# Patient Record
Sex: MALE | Race: WHITE | NOT HISPANIC OR LATINO
[De-identification: names, ages, dates, MRNs, and addresses within clinical notes are randomized per-mention and may not be internally consistent; named-entity substitution may affect disease eponyms.]

---

## 2017-01-03 ENCOUNTER — MEDICATION RENEWAL (OUTPATIENT)
Age: 62
End: 2017-01-03

## 2017-01-24 ENCOUNTER — MEDICATION RENEWAL (OUTPATIENT)
Age: 62
End: 2017-01-24

## 2017-06-26 ENCOUNTER — TRANSCRIPTION ENCOUNTER (OUTPATIENT)
Age: 62
End: 2017-06-26

## 2017-08-14 ENCOUNTER — RX RENEWAL (OUTPATIENT)
Age: 62
End: 2017-08-14

## 2017-08-21 ENCOUNTER — RX RENEWAL (OUTPATIENT)
Age: 62
End: 2017-08-21

## 2017-11-07 ENCOUNTER — RX RENEWAL (OUTPATIENT)
Age: 62
End: 2017-11-07

## 2017-12-11 ENCOUNTER — MEDICATION RENEWAL (OUTPATIENT)
Age: 62
End: 2017-12-11

## 2017-12-16 ENCOUNTER — RX RENEWAL (OUTPATIENT)
Age: 62
End: 2017-12-16

## 2017-12-29 ENCOUNTER — CLINICAL ADVICE (OUTPATIENT)
Age: 62
End: 2017-12-29

## 2018-01-24 ENCOUNTER — APPOINTMENT (OUTPATIENT)
Dept: INTERNAL MEDICINE | Facility: CLINIC | Age: 63
End: 2018-01-24
Payer: COMMERCIAL

## 2018-01-24 VITALS
SYSTOLIC BLOOD PRESSURE: 145 MMHG | WEIGHT: 260 LBS | HEIGHT: 71 IN | BODY MASS INDEX: 36.4 KG/M2 | DIASTOLIC BLOOD PRESSURE: 83 MMHG | HEART RATE: 91 BPM

## 2018-01-24 PROCEDURE — 99214 OFFICE O/P EST MOD 30 MIN: CPT

## 2018-02-22 ENCOUNTER — RX RENEWAL (OUTPATIENT)
Age: 63
End: 2018-02-22

## 2018-03-03 ENCOUNTER — RX RENEWAL (OUTPATIENT)
Age: 63
End: 2018-03-03

## 2018-04-05 ENCOUNTER — NON-APPOINTMENT (OUTPATIENT)
Age: 63
End: 2018-04-05

## 2018-04-05 ENCOUNTER — APPOINTMENT (OUTPATIENT)
Dept: INTERNAL MEDICINE | Facility: CLINIC | Age: 63
End: 2018-04-05
Payer: COMMERCIAL

## 2018-04-05 ENCOUNTER — LABORATORY RESULT (OUTPATIENT)
Age: 63
End: 2018-04-05

## 2018-04-05 VITALS — SYSTOLIC BLOOD PRESSURE: 140 MMHG | DIASTOLIC BLOOD PRESSURE: 70 MMHG

## 2018-04-05 DIAGNOSIS — R73.09 OTHER ABNORMAL GLUCOSE: ICD-10-CM

## 2018-04-05 LAB
BILIRUB UR QL STRIP: NORMAL
CLARITY UR: CLEAR
COLLECTION METHOD: NORMAL
GLUCOSE UR-MCNC: NORMAL
HCG UR QL: 0.2 EU/DL
HGB UR QL STRIP.AUTO: NORMAL
KETONES UR-MCNC: NORMAL
LEUKOCYTE ESTERASE UR QL STRIP: NORMAL
NITRITE UR QL STRIP: NORMAL
PH UR STRIP: 5.5
PROT UR STRIP-MCNC: NORMAL
SP GR UR STRIP: 1.02

## 2018-04-05 PROCEDURE — 81003 URINALYSIS AUTO W/O SCOPE: CPT | Mod: QW

## 2018-04-05 PROCEDURE — 99396 PREV VISIT EST AGE 40-64: CPT | Mod: 25

## 2018-04-05 PROCEDURE — G0009: CPT

## 2018-04-05 PROCEDURE — G0444 DEPRESSION SCREEN ANNUAL: CPT | Mod: 59

## 2018-04-05 PROCEDURE — 36415 COLL VENOUS BLD VENIPUNCTURE: CPT

## 2018-04-05 PROCEDURE — G0447 BEHAVIOR COUNSEL OBESITY 15M: CPT

## 2018-04-05 PROCEDURE — 90670 PCV13 VACCINE IM: CPT

## 2018-04-05 PROCEDURE — 93000 ELECTROCARDIOGRAM COMPLETE: CPT

## 2018-04-05 RX ORDER — DOXYCYCLINE HYCLATE 100 MG/1
100 CAPSULE ORAL DAILY
Qty: 10 | Refills: 0 | Status: DISCONTINUED | COMMUNITY
Start: 2017-12-29 | End: 2018-04-05

## 2018-04-08 ENCOUNTER — CLINICAL ADVICE (OUTPATIENT)
Age: 63
End: 2018-04-08

## 2018-04-08 LAB
25(OH)D3 SERPL-MCNC: 28.7 NG/ML
ALBUMIN SERPL ELPH-MCNC: 4.2 G/DL
ALP BLD-CCNC: 123 U/L
ALT SERPL-CCNC: 36 U/L
ANION GAP SERPL CALC-SCNC: 17 MMOL/L
AST SERPL-CCNC: 24 U/L
BASOPHILS # BLD AUTO: 0.03 K/UL
BASOPHILS NFR BLD AUTO: 0.2 %
BILIRUB SERPL-MCNC: 0.4 MG/DL
BUN SERPL-MCNC: 33 MG/DL
CALCIUM SERPL-MCNC: 9.8 MG/DL
CHLORIDE SERPL-SCNC: 99 MMOL/L
CHOLEST SERPL-MCNC: 161 MG/DL
CHOLEST/HDLC SERPL: 3.7 RATIO
CO2 SERPL-SCNC: 24 MMOL/L
CREAT SERPL-MCNC: 1.33 MG/DL
EOSINOPHIL # BLD AUTO: 0.1 K/UL
EOSINOPHIL NFR BLD AUTO: 0.8 %
FERRITIN SERPL-MCNC: 333 NG/ML
GLUCOSE SERPL-MCNC: 115 MG/DL
HBA1C MFR BLD HPLC: 6.2 %
HCT VFR BLD CALC: 43.6 %
HDLC SERPL-MCNC: 43 MG/DL
HGB BLD-MCNC: 14.5 G/DL
IMM GRANULOCYTES NFR BLD AUTO: 0.6 %
LDLC SERPL CALC-MCNC: 85 MG/DL
LYMPHOCYTES # BLD AUTO: 2.52 K/UL
LYMPHOCYTES NFR BLD AUTO: 20.2 %
MAN DIFF?: NORMAL
MCHC RBC-ENTMCNC: 30 PG
MCHC RBC-ENTMCNC: 33.3 GM/DL
MCV RBC AUTO: 90.1 FL
MONOCYTES # BLD AUTO: 0.92 K/UL
MONOCYTES NFR BLD AUTO: 7.4 %
NEUTROPHILS # BLD AUTO: 8.84 K/UL
NEUTROPHILS NFR BLD AUTO: 70.8 %
PLATELET # BLD AUTO: 276 K/UL
POTASSIUM SERPL-SCNC: 4.2 MMOL/L
PROT SERPL-MCNC: 7.3 G/DL
PSA SERPL-MCNC: 1.41 NG/ML
RBC # BLD: 4.84 M/UL
RBC # FLD: 13.4 %
SAVE SPECIMEN: NORMAL
SODIUM SERPL-SCNC: 140 MMOL/L
T3RU NFR SERPL: 1.01 INDEX
T4 SERPL-MCNC: 8.5 UG/DL
TRIGL SERPL-MCNC: 164 MG/DL
TSH SERPL-ACNC: 1.37 UIU/ML
URATE SERPL-MCNC: 9.2 MG/DL
VIT B12 SERPL-MCNC: 540 PG/ML
WBC # FLD AUTO: 12.48 K/UL

## 2018-04-18 ENCOUNTER — MESSAGE (OUTPATIENT)
Age: 63
End: 2018-04-18

## 2018-04-20 ENCOUNTER — RX RENEWAL (OUTPATIENT)
Age: 63
End: 2018-04-20

## 2018-05-22 ENCOUNTER — LABORATORY RESULT (OUTPATIENT)
Age: 63
End: 2018-05-22

## 2018-05-23 ENCOUNTER — APPOINTMENT (OUTPATIENT)
Dept: INTERNAL MEDICINE | Facility: CLINIC | Age: 63
End: 2018-05-23
Payer: COMMERCIAL

## 2018-05-23 PROCEDURE — 36415 COLL VENOUS BLD VENIPUNCTURE: CPT

## 2018-05-24 LAB
25(OH)D3 SERPL-MCNC: 25.6 NG/ML
ALBUMIN SERPL ELPH-MCNC: 4.1 G/DL
ALP BLD-CCNC: 97 U/L
ALT SERPL-CCNC: 24 U/L
ANION GAP SERPL CALC-SCNC: 13 MMOL/L
AST SERPL-CCNC: 23 U/L
BASOPHILS # BLD AUTO: 0.02 K/UL
BASOPHILS NFR BLD AUTO: 0.4 %
BILIRUB SERPL-MCNC: 0.2 MG/DL
BUN SERPL-MCNC: 19 MG/DL
CALCIUM SERPL-MCNC: 9.5 MG/DL
CHLORIDE SERPL-SCNC: 104 MMOL/L
CHOLEST SERPL-MCNC: 156 MG/DL
CHOLEST/HDLC SERPL: 3.9 RATIO
CO2 SERPL-SCNC: 24 MMOL/L
CREAT SERPL-MCNC: 1.29 MG/DL
EOSINOPHIL # BLD AUTO: 0.07 K/UL
EOSINOPHIL NFR BLD AUTO: 1.3 %
HBA1C MFR BLD HPLC: 6.1 %
HCT VFR BLD CALC: 46.9 %
HDLC SERPL-MCNC: 40 MG/DL
HGB BLD-MCNC: 14.3 G/DL
IMM GRANULOCYTES NFR BLD AUTO: 0.6 %
LDLC SERPL CALC-MCNC: 84 MG/DL
LYMPHOCYTES # BLD AUTO: 1.25 K/UL
LYMPHOCYTES NFR BLD AUTO: 23.2 %
MAN DIFF?: NORMAL
MCHC RBC-ENTMCNC: 29 PG
MCHC RBC-ENTMCNC: 30.5 GM/DL
MCV RBC AUTO: 95.1 FL
MONOCYTES # BLD AUTO: 0.37 K/UL
MONOCYTES NFR BLD AUTO: 6.9 %
NEUTROPHILS # BLD AUTO: 3.64 K/UL
NEUTROPHILS NFR BLD AUTO: 67.6 %
PLATELET # BLD AUTO: 193 K/UL
POTASSIUM SERPL-SCNC: 4.3 MMOL/L
PROT SERPL-MCNC: 6.7 G/DL
PSA SERPL-MCNC: 1.09 NG/ML
RBC # BLD: 4.93 M/UL
RBC # FLD: 14.2 %
SAVE SPECIMEN: NORMAL
SODIUM SERPL-SCNC: 141 MMOL/L
T3RU NFR SERPL: 1.04 INDEX
T4 SERPL-MCNC: 7.5 UG/DL
TRIGL SERPL-MCNC: 159 MG/DL
TSH SERPL-ACNC: 1.45 UIU/ML
URATE SERPL-MCNC: 7.9 MG/DL
WBC # FLD AUTO: 5.38 K/UL

## 2018-05-25 LAB — GLUCOSE SERPL-MCNC: 154 MG/DL

## 2018-05-26 ENCOUNTER — RX RENEWAL (OUTPATIENT)
Age: 63
End: 2018-05-26

## 2018-05-29 ENCOUNTER — RX RENEWAL (OUTPATIENT)
Age: 63
End: 2018-05-29

## 2018-06-01 ENCOUNTER — RX RENEWAL (OUTPATIENT)
Age: 63
End: 2018-06-01

## 2018-07-20 ENCOUNTER — RX RENEWAL (OUTPATIENT)
Age: 63
End: 2018-07-20

## 2018-08-02 ENCOUNTER — LABORATORY RESULT (OUTPATIENT)
Age: 63
End: 2018-08-02

## 2018-08-02 ENCOUNTER — RX RENEWAL (OUTPATIENT)
Age: 63
End: 2018-08-02

## 2018-08-02 ENCOUNTER — APPOINTMENT (OUTPATIENT)
Dept: INTERNAL MEDICINE | Facility: CLINIC | Age: 63
End: 2018-08-02
Payer: COMMERCIAL

## 2018-08-02 VITALS — BODY MASS INDEX: 35.49 KG/M2 | WEIGHT: 262 LBS | HEIGHT: 72 IN

## 2018-08-02 VITALS — SYSTOLIC BLOOD PRESSURE: 120 MMHG | DIASTOLIC BLOOD PRESSURE: 70 MMHG

## 2018-08-02 PROCEDURE — 99215 OFFICE O/P EST HI 40 MIN: CPT | Mod: 25

## 2018-08-02 PROCEDURE — 81003 URINALYSIS AUTO W/O SCOPE: CPT | Mod: QW

## 2018-08-02 PROCEDURE — 36415 COLL VENOUS BLD VENIPUNCTURE: CPT

## 2018-08-02 RX ORDER — METOPROLOL SUCCINATE 25 MG/1
25 TABLET, EXTENDED RELEASE ORAL
Qty: 270 | Refills: 0 | Status: DISCONTINUED | COMMUNITY
Start: 2017-09-14 | End: 2018-08-02

## 2018-08-02 RX ORDER — METHYLPREDNISOLONE 4 MG/1
4 TABLET ORAL
Qty: 21 | Refills: 0 | Status: DISCONTINUED | COMMUNITY
Start: 2018-01-08 | End: 2018-08-02

## 2018-08-02 RX ORDER — PREDNISONE 10 MG/1
10 TABLET ORAL
Qty: 15 | Refills: 0 | Status: DISCONTINUED | COMMUNITY
Start: 2018-04-02 | End: 2018-08-02

## 2018-08-02 RX ORDER — PREDNISONE 20 MG/1
20 TABLET ORAL
Qty: 18 | Refills: 0 | Status: DISCONTINUED | COMMUNITY
Start: 2018-04-18 | End: 2018-08-02

## 2018-08-02 RX ORDER — INDOMETHACIN 25 MG/1
25 CAPSULE ORAL
Qty: 10 | Refills: 0 | Status: DISCONTINUED | COMMUNITY
Start: 2018-01-08 | End: 2018-08-02

## 2018-08-02 NOTE — PHYSICAL EXAM
[No Acute Distress] : no acute distress [Well Nourished] : well nourished [No Respiratory Distress] : no respiratory distress  [Clear to Auscultation] : lungs were clear to auscultation bilaterally [Normal Rate] : normal rate  [Regular Rhythm] : with a regular rhythm [Normal S1, S2] : normal S1 and S2 [Soft] : abdomen soft [Non Tender] : non-tender [Non-distended] : non-distended [de-identified] : joint swelling ankles

## 2018-08-02 NOTE — REVIEW OF SYSTEMS
[Fatigue] : fatigue [Abdominal Pain] : abdominal pain [Nausea] : nausea [Joint Pain] : joint pain [Joint Stiffness] : joint stiffness [Joint Swelling] : joint swelling [Negative] : Genitourinary

## 2018-08-02 NOTE — HISTORY OF PRESENT ILLNESS
[de-identified] : This is a 63-year-old gentleman with a history of myocardial infarction, angina, hypertension, gout, hypercholesterolemia who is here because of severe gouty attack of both ankles

## 2018-08-02 NOTE — ASSESSMENT
[FreeTextEntry1] : The patient's physical examination was positive for significant obesity and tenderness and swelling of both ankles. A high refer the patient for a  CT of the abdomen and pelvis .\par I advised him to continue the lower lid is a Medrol pack and colchicine 0.6 mg daily

## 2018-08-03 LAB
25(OH)D3 SERPL-MCNC: 26.6 NG/ML
ALBUMIN SERPL ELPH-MCNC: 4.6 G/DL
ALP BLD-CCNC: 103 U/L
ALT SERPL-CCNC: 22 U/L
ANION GAP SERPL CALC-SCNC: 13 MMOL/L
AST SERPL-CCNC: 21 U/L
BASOPHILS # BLD AUTO: 0.01 K/UL
BASOPHILS NFR BLD AUTO: 0.1 %
BILIRUB SERPL-MCNC: 0.3 MG/DL
BUN SERPL-MCNC: 16 MG/DL
CALCIUM SERPL-MCNC: 9.8 MG/DL
CHLORIDE SERPL-SCNC: 102 MMOL/L
CHOLEST SERPL-MCNC: 174 MG/DL
CHOLEST/HDLC SERPL: 3.5 RATIO
CO2 SERPL-SCNC: 26 MMOL/L
CREAT SERPL-MCNC: 1.19 MG/DL
EOSINOPHIL # BLD AUTO: 0.05 K/UL
EOSINOPHIL NFR BLD AUTO: 0.5 %
GLUCOSE SERPL-MCNC: 130 MG/DL
HBA1C MFR BLD HPLC: 6.2 %
HCT VFR BLD CALC: 47.2 %
HDLC SERPL-MCNC: 50 MG/DL
HGB BLD-MCNC: 14.8 G/DL
IMM GRANULOCYTES NFR BLD AUTO: 0.4 %
LDLC SERPL CALC-MCNC: 103 MG/DL
LYMPHOCYTES # BLD AUTO: 1.32 K/UL
LYMPHOCYTES NFR BLD AUTO: 12.2 %
MAN DIFF?: NORMAL
MCHC RBC-ENTMCNC: 28.7 PG
MCHC RBC-ENTMCNC: 31.4 GM/DL
MCV RBC AUTO: 91.5 FL
MONOCYTES # BLD AUTO: 0.54 K/UL
MONOCYTES NFR BLD AUTO: 5 %
NEUTROPHILS # BLD AUTO: 8.88 K/UL
NEUTROPHILS NFR BLD AUTO: 81.8 %
PLATELET # BLD AUTO: 184 K/UL
POTASSIUM SERPL-SCNC: 4.3 MMOL/L
PROT SERPL-MCNC: 7.2 G/DL
PSA SERPL-MCNC: 1.1 NG/ML
RBC # BLD: 5.16 M/UL
RBC # FLD: 13.6 %
SAVE SPECIMEN: NORMAL
SODIUM SERPL-SCNC: 141 MMOL/L
T3RU NFR SERPL: 1.12 INDEX
T4 SERPL-MCNC: 7.5 UG/DL
TRIGL SERPL-MCNC: 106 MG/DL
TSH SERPL-ACNC: 1.11 UIU/ML
URATE SERPL-MCNC: 4.3 MG/DL
WBC # FLD AUTO: 10.84 K/UL

## 2018-08-19 ENCOUNTER — FORM ENCOUNTER (OUTPATIENT)
Age: 63
End: 2018-08-19

## 2018-08-20 ENCOUNTER — OUTPATIENT (OUTPATIENT)
Dept: OUTPATIENT SERVICES | Facility: HOSPITAL | Age: 63
LOS: 1 days | End: 2018-08-20
Payer: COMMERCIAL

## 2018-08-20 ENCOUNTER — APPOINTMENT (OUTPATIENT)
Dept: CT IMAGING | Facility: CLINIC | Age: 63
End: 2018-08-20

## 2018-08-20 DIAGNOSIS — Z00.8 ENCOUNTER FOR OTHER GENERAL EXAMINATION: ICD-10-CM

## 2018-08-20 PROCEDURE — 74177 CT ABD & PELVIS W/CONTRAST: CPT | Mod: 26

## 2018-08-20 PROCEDURE — 74177 CT ABD & PELVIS W/CONTRAST: CPT

## 2018-08-27 ENCOUNTER — MEDICATION RENEWAL (OUTPATIENT)
Age: 63
End: 2018-08-27

## 2018-08-28 ENCOUNTER — APPOINTMENT (OUTPATIENT)
Dept: UROLOGY | Facility: CLINIC | Age: 63
End: 2018-08-28
Payer: COMMERCIAL

## 2018-08-28 VITALS
DIASTOLIC BLOOD PRESSURE: 78 MMHG | RESPIRATION RATE: 18 BRPM | HEART RATE: 80 BPM | TEMPERATURE: 97.6 F | SYSTOLIC BLOOD PRESSURE: 126 MMHG

## 2018-08-28 PROCEDURE — 99244 OFF/OP CNSLTJ NEW/EST MOD 40: CPT

## 2018-08-30 ENCOUNTER — APPOINTMENT (OUTPATIENT)
Dept: UROLOGY | Facility: CLINIC | Age: 63
End: 2018-08-30

## 2018-09-01 ENCOUNTER — RX RENEWAL (OUTPATIENT)
Age: 63
End: 2018-09-01

## 2018-09-03 LAB
APPEARANCE: CLEAR
BACTERIA UR CULT: NORMAL
BACTERIA: NEGATIVE
BILIRUBIN URINE: NEGATIVE
BLOOD URINE: NEGATIVE
COLOR: YELLOW
CORE LAB FLUID CYTOLOGY: NORMAL
GLUCOSE QUALITATIVE U: NEGATIVE MG/DL
HYALINE CASTS: 2 /LPF
KETONES URINE: NEGATIVE
LEUKOCYTE ESTERASE URINE: NEGATIVE
MICROSCOPIC-UA: NORMAL
NITRITE URINE: NEGATIVE
PH URINE: 6
PROTEIN URINE: NEGATIVE MG/DL
RED BLOOD CELLS URINE: 7 /HPF
SPECIFIC GRAVITY URINE: 1.02
SQUAMOUS EPITHELIAL CELLS: 0 /HPF
UROBILINOGEN URINE: NEGATIVE MG/DL
WHITE BLOOD CELLS URINE: 1 /HPF

## 2018-09-04 ENCOUNTER — FORM ENCOUNTER (OUTPATIENT)
Age: 63
End: 2018-09-04

## 2018-09-05 ENCOUNTER — TRANSCRIPTION ENCOUNTER (OUTPATIENT)
Age: 63
End: 2018-09-05

## 2018-09-05 ENCOUNTER — APPOINTMENT (OUTPATIENT)
Dept: MRI IMAGING | Facility: CLINIC | Age: 63
End: 2018-09-05
Payer: COMMERCIAL

## 2018-09-05 ENCOUNTER — OUTPATIENT (OUTPATIENT)
Dept: OUTPATIENT SERVICES | Facility: HOSPITAL | Age: 63
LOS: 1 days | End: 2018-09-05
Payer: COMMERCIAL

## 2018-09-05 ENCOUNTER — OTHER (OUTPATIENT)
Age: 63
End: 2018-09-05

## 2018-09-05 DIAGNOSIS — N28.89 OTHER SPECIFIED DISORDERS OF KIDNEY AND URETER: ICD-10-CM

## 2018-09-05 PROCEDURE — 74183 MRI ABD W/O CNTR FLWD CNTR: CPT

## 2018-09-05 PROCEDURE — A9585: CPT

## 2018-09-05 PROCEDURE — 74183 MRI ABD W/O CNTR FLWD CNTR: CPT | Mod: 26

## 2018-09-06 ENCOUNTER — APPOINTMENT (OUTPATIENT)
Dept: UROLOGY | Facility: CLINIC | Age: 63
End: 2018-09-06
Payer: COMMERCIAL

## 2018-09-06 DIAGNOSIS — K42.9 UMBILICAL HERNIA W/OUT OBSTRUCTION OR GANGRENE: ICD-10-CM

## 2018-09-06 DIAGNOSIS — N28.89 OTHER SPECIFIED DISORDERS OF KIDNEY AND URETER: ICD-10-CM

## 2018-09-06 PROCEDURE — 99215 OFFICE O/P EST HI 40 MIN: CPT

## 2018-09-07 ENCOUNTER — RX RENEWAL (OUTPATIENT)
Age: 63
End: 2018-09-07

## 2018-09-11 ENCOUNTER — APPOINTMENT (OUTPATIENT)
Dept: UROLOGY | Facility: CLINIC | Age: 63
End: 2018-09-11
Payer: COMMERCIAL

## 2018-09-11 DIAGNOSIS — Z80.9 FAMILY HISTORY OF MALIGNANT NEOPLASM, UNSPECIFIED: ICD-10-CM

## 2018-09-11 DIAGNOSIS — Z80.42 FAMILY HISTORY OF MALIGNANT NEOPLASM OF PROSTATE: ICD-10-CM

## 2018-09-11 PROCEDURE — 99215 OFFICE O/P EST HI 40 MIN: CPT

## 2018-09-12 PROBLEM — Z80.42 FAMILY HISTORY OF MALIGNANT NEOPLASM OF PROSTATE: Status: ACTIVE | Noted: 2018-08-28

## 2018-09-12 PROBLEM — Z80.9 FAMILY HISTORY OF MALIGNANT NEOPLASM: Status: ACTIVE | Noted: 2018-08-28

## 2018-09-12 LAB
APPEARANCE: CLEAR
BACTERIA: NEGATIVE
BILIRUBIN URINE: NEGATIVE
BLOOD URINE: NEGATIVE
COLOR: YELLOW
CORE LAB FLUID CYTOLOGY: NORMAL
GLUCOSE QUALITATIVE U: NEGATIVE MG/DL
KETONES URINE: NEGATIVE
LEUKOCYTE ESTERASE URINE: NEGATIVE
MICROSCOPIC-UA: NORMAL
NITRITE URINE: NEGATIVE
PH URINE: 5.5
PROTEIN URINE: NEGATIVE MG/DL
RED BLOOD CELLS URINE: 2 /HPF
SPECIFIC GRAVITY URINE: 1.02
SQUAMOUS EPITHELIAL CELLS: 0 /HPF
UROBILINOGEN URINE: NEGATIVE MG/DL
WHITE BLOOD CELLS URINE: 0 /HPF

## 2018-09-13 ENCOUNTER — OUTPATIENT (OUTPATIENT)
Dept: OUTPATIENT SERVICES | Facility: HOSPITAL | Age: 63
LOS: 1 days | End: 2018-09-13
Payer: COMMERCIAL

## 2018-09-13 VITALS
DIASTOLIC BLOOD PRESSURE: 80 MMHG | OXYGEN SATURATION: 99 % | HEIGHT: 69.25 IN | SYSTOLIC BLOOD PRESSURE: 130 MMHG | RESPIRATION RATE: 16 BRPM | HEART RATE: 72 BPM | TEMPERATURE: 98 F | WEIGHT: 259.93 LBS

## 2018-09-13 DIAGNOSIS — Z98.890 OTHER SPECIFIED POSTPROCEDURAL STATES: Chronic | ICD-10-CM

## 2018-09-13 DIAGNOSIS — D49.59 NEOPLASM OF UNSPECIFIED BEHAVIOR OF OTHER GENITOURINARY ORGAN: ICD-10-CM

## 2018-09-13 DIAGNOSIS — E66.9 OBESITY, UNSPECIFIED: ICD-10-CM

## 2018-09-13 DIAGNOSIS — I10 ESSENTIAL (PRIMARY) HYPERTENSION: ICD-10-CM

## 2018-09-13 DIAGNOSIS — I25.10 ATHEROSCLEROTIC HEART DISEASE OF NATIVE CORONARY ARTERY WITHOUT ANGINA PECTORIS: ICD-10-CM

## 2018-09-13 DIAGNOSIS — Z98.61 CORONARY ANGIOPLASTY STATUS: Chronic | ICD-10-CM

## 2018-09-13 LAB
APPEARANCE UR: CLEAR — SIGNIFICANT CHANGE UP
BILIRUB UR-MCNC: NEGATIVE — SIGNIFICANT CHANGE UP
BLD GP AB SCN SERPL QL: NEGATIVE — SIGNIFICANT CHANGE UP
BLOOD UR QL VISUAL: NEGATIVE — SIGNIFICANT CHANGE UP
BUN SERPL-MCNC: 24 MG/DL — HIGH (ref 7–23)
CALCIUM SERPL-MCNC: 9.5 MG/DL — SIGNIFICANT CHANGE UP (ref 8.4–10.5)
CHLORIDE SERPL-SCNC: 102 MMOL/L — SIGNIFICANT CHANGE UP (ref 98–107)
CO2 SERPL-SCNC: 24 MMOL/L — SIGNIFICANT CHANGE UP (ref 22–31)
COLOR SPEC: YELLOW — SIGNIFICANT CHANGE UP
CREAT SERPL-MCNC: 1.12 MG/DL — SIGNIFICANT CHANGE UP (ref 0.5–1.3)
GLUCOSE SERPL-MCNC: 110 MG/DL — HIGH (ref 70–99)
GLUCOSE UR-MCNC: NEGATIVE — SIGNIFICANT CHANGE UP
HCT VFR BLD CALC: 45 % — SIGNIFICANT CHANGE UP (ref 39–50)
HGB BLD-MCNC: 15.1 G/DL — SIGNIFICANT CHANGE UP (ref 13–17)
KETONES UR-MCNC: NEGATIVE — SIGNIFICANT CHANGE UP
LEUKOCYTE ESTERASE UR-ACNC: NEGATIVE — SIGNIFICANT CHANGE UP
MCHC RBC-ENTMCNC: 29.5 PG — SIGNIFICANT CHANGE UP (ref 27–34)
MCHC RBC-ENTMCNC: 33.6 % — SIGNIFICANT CHANGE UP (ref 32–36)
MCV RBC AUTO: 87.9 FL — SIGNIFICANT CHANGE UP (ref 80–100)
NITRITE UR-MCNC: NEGATIVE — SIGNIFICANT CHANGE UP
NRBC # FLD: 0 — SIGNIFICANT CHANGE UP
PH UR: 6 — SIGNIFICANT CHANGE UP (ref 5–8)
PLATELET # BLD AUTO: 222 K/UL — SIGNIFICANT CHANGE UP (ref 150–400)
PMV BLD: 9.9 FL — SIGNIFICANT CHANGE UP (ref 7–13)
POTASSIUM SERPL-MCNC: 4.1 MMOL/L — SIGNIFICANT CHANGE UP (ref 3.5–5.3)
POTASSIUM SERPL-SCNC: 4.1 MMOL/L — SIGNIFICANT CHANGE UP (ref 3.5–5.3)
PROT UR-MCNC: 10 — SIGNIFICANT CHANGE UP
RBC # BLD: 5.12 M/UL — SIGNIFICANT CHANGE UP (ref 4.2–5.8)
RBC # FLD: 13.1 % — SIGNIFICANT CHANGE UP (ref 10.3–14.5)
RH IG SCN BLD-IMP: POSITIVE — SIGNIFICANT CHANGE UP
SODIUM SERPL-SCNC: 140 MMOL/L — SIGNIFICANT CHANGE UP (ref 135–145)
SP GR SPEC: 1.02 — SIGNIFICANT CHANGE UP (ref 1–1.04)
UROBILINOGEN FLD QL: NORMAL — SIGNIFICANT CHANGE UP
WBC # BLD: 5.93 K/UL — SIGNIFICANT CHANGE UP (ref 3.8–10.5)
WBC # FLD AUTO: 5.93 K/UL — SIGNIFICANT CHANGE UP (ref 3.8–10.5)

## 2018-09-13 PROCEDURE — 93010 ELECTROCARDIOGRAM REPORT: CPT

## 2018-09-13 NOTE — H&P PST ADULT - HISTORY OF PRESENT ILLNESS
64y/o male presents for preop eval for scheduled right laparoscopic partial nephrectomy on 9/24/2018.  Pt states few months ago experienced abdominal pain, evaluated by pcp.  Ct scan done  and revealed density in right Kidney.  Referred  to urology MRI done.  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

## 2018-09-13 NOTE — H&P PST ADULT - NEGATIVE GENERAL GENITOURINARY SYMPTOMS
no hematuria/no renal colic/no dysuria/no flank pain L/no flank pain R/normal urinary frequency normal urinary frequency/no flank pain R/no dysuria/no hematuria/no renal colic/no flank pain L/right kidney mass - refer to hpi

## 2018-09-13 NOTE — H&P PST ADULT - PMH
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

## 2018-09-13 NOTE — H&P PST ADULT - NEGATIVE CARDIOVASCULAR SYMPTOMS
no chest pain/no dyspnea on exertion/no claudication/no peripheral edema/no orthopnea/no paroxysmal nocturnal dyspnea/no palpitations

## 2018-09-13 NOTE — H&P PST ADULT - PROBLEM SELECTOR PLAN 1
scheduled right laparoscopic partial nephrectomy on 9/24/2018.  preop instructions, gi prophylaxis surgical soap given  pt verbalized understanding   abo on admit

## 2018-09-13 NOTE — H&P PST ADULT - PROBLEM SELECTOR PLAN 2
pt with 1 coronary stent inserted 2009   On plavix & low dose aspirin  Instructed last dose plavix 9/19/18 and continue aspirin, pt to confirm with cardiologist - Dr Betancourt  pending copy of  last cardiology note 7/2018 and Echo & stress test report  medical eval requested by surgeon  pending copy of report

## 2018-09-13 NOTE — H&P PST ADULT - PSH
Arthroplasty, Knee  h/o left  H/O coronary angioplasty  1 stent 2009  History of foot surgery  right

## 2018-09-13 NOTE — H&P PST ADULT - NSANTHOSAYNRD_GEN_A_CORE
No. SHILO screening performed.  STOP BANG Legend: 0-2 = LOW Risk; 3-4 = INTERMEDIATE Risk; 5-8 = HIGH Risk

## 2018-09-13 NOTE — H&P PST ADULT - PROBLEM SELECTOR PROBLEM 4
ANTICOAGULATION FOLLOW-UP CLINIC VISIT    Patient Name:  Shelly Rogers  Date:  10/16/2017  Contact Type:  Face to Face    SUBJECTIVE:     Patient Findings     Positives No Problem Findings           OBJECTIVE    INR Protime   Date Value Ref Range Status   10/16/2017 2.1 (A) 0.86 - 1.14 Final       ASSESSMENT / PLAN  INR assessment THER    Recheck INR In: 4 WEEKS    INR Location Clinic      Anticoagulation Summary as of 10/16/2017     INR goal 2.0-3.0   Today's INR 2.1   Maintenance plan 5 mg (5 mg x 1) on Wed; 2.5 mg (5 mg x 0.5) all other days   Full instructions 5 mg on Wed; 2.5 mg all other days   Weekly total 20 mg   Plan last modified Monet Jaramillo RN (6/19/2017)   Next INR check    Priority INR   Target end date     Indications   Long-term (current) use of anticoagulants [Z79.01] [Z79.01]  Atrial fibrillation (H) [I48.91]         Anticoagulation Episode Summary     INR check location     Preferred lab     Send INR reminders to CS ANTICOAGULATION    Comments       Anticoagulation Care Providers     Provider Role Specialty Phone number    Halley Huff MD [facility] Internal Medicine [phone]            See the Encounter Report to view Anticoagulation Flowsheet and Dosing Calendar (Go to Encounters tab in chart review, and find the Anticoagulation Therapy Visit)    Dosage adjustment made based on physician directed care plan.    Tana Brown RN               
Obesity

## 2018-09-13 NOTE — H&P PST ADULT - FAMILY HISTORY
Father  Still living? No  Family history of heart attack, Age at diagnosis: Age Unknown  Family history of prostate cancer, Age at diagnosis: Age Unknown  Family history of colon cancer, Age at diagnosis: Age Unknown     Sibling  Still living? Yes, Estimated age: Age Unknown  Family history of prostate cancer, Age at diagnosis: Age Unknown     Mother  Still living? No  Family history of colon cancer, Age at diagnosis: Age Unknown

## 2018-09-15 LAB
BACTERIA UR CULT: SIGNIFICANT CHANGE UP
SPECIMEN SOURCE: SIGNIFICANT CHANGE UP

## 2018-09-17 ENCOUNTER — APPOINTMENT (OUTPATIENT)
Dept: INTERNAL MEDICINE | Facility: CLINIC | Age: 63
End: 2018-09-17
Payer: COMMERCIAL

## 2018-09-17 VITALS — SYSTOLIC BLOOD PRESSURE: 120 MMHG | DIASTOLIC BLOOD PRESSURE: 70 MMHG

## 2018-09-17 VITALS — BODY MASS INDEX: 34.67 KG/M2 | HEIGHT: 72 IN | WEIGHT: 256 LBS

## 2018-09-17 DIAGNOSIS — R31.29 OTHER MICROSCOPIC HEMATURIA: ICD-10-CM

## 2018-09-17 PROCEDURE — 99214 OFFICE O/P EST MOD 30 MIN: CPT

## 2018-09-17 NOTE — ASSESSMENT
[Patient Optimized for Surgery] : Patient optimized for surgery [No Further Testing Recommended] : no further testing recommended [Continue anti-platelet treatment as is] : Continue current anti-platelet treatment [As per surgery] : as per surgery [FreeTextEntry4] : The patient's physical examination was normal except for obesity. His EKG and bloods were normal. The patient was advised to hold his Plavix 5 days prior to his surgery but to continue his aspirin. The patient is medically cleared for surgery

## 2018-09-17 NOTE — CONSULT LETTER
[Referral Letter:] : I am referring [unfilled] to you for further evaluation.  My most recent evaluation follows. [Sincerely,] : Sincerely, [FreeTextEntry1] : Right renal alis

## 2018-09-17 NOTE — HISTORY OF PRESENT ILLNESS
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

## 2018-09-23 ENCOUNTER — TRANSCRIPTION ENCOUNTER (OUTPATIENT)
Age: 63
End: 2018-09-23

## 2018-09-24 ENCOUNTER — INPATIENT (INPATIENT)
Facility: HOSPITAL | Age: 63
LOS: 1 days | Discharge: HOME | End: 2018-09-26
Attending: UROLOGY | Admitting: UROLOGY
Payer: COMMERCIAL

## 2018-09-24 ENCOUNTER — RESULT REVIEW (OUTPATIENT)
Age: 63
End: 2018-09-24

## 2018-09-24 ENCOUNTER — APPOINTMENT (OUTPATIENT)
Dept: UROLOGY | Facility: HOSPITAL | Age: 63
End: 2018-09-24

## 2018-09-24 VITALS
OXYGEN SATURATION: 96 % | TEMPERATURE: 99 F | HEART RATE: 81 BPM | SYSTOLIC BLOOD PRESSURE: 130 MMHG | WEIGHT: 259.93 LBS | RESPIRATION RATE: 16 BRPM | HEIGHT: 69.25 IN | DIASTOLIC BLOOD PRESSURE: 74 MMHG

## 2018-09-24 DIAGNOSIS — Z98.890 OTHER SPECIFIED POSTPROCEDURAL STATES: Chronic | ICD-10-CM

## 2018-09-24 DIAGNOSIS — Z98.61 CORONARY ANGIOPLASTY STATUS: Chronic | ICD-10-CM

## 2018-09-24 DIAGNOSIS — D49.59 NEOPLASM OF UNSPECIFIED BEHAVIOR OF OTHER GENITOURINARY ORGAN: ICD-10-CM

## 2018-09-24 PROCEDURE — 76998 US GUIDE INTRAOP: CPT | Mod: 26

## 2018-09-24 PROCEDURE — 50543 LAPARO PARTIAL NEPHRECTOMY: CPT | Mod: RT

## 2018-09-24 PROCEDURE — 88312 SPECIAL STAINS GROUP 1: CPT | Mod: 26

## 2018-09-24 PROCEDURE — 88331 PATH CONSLTJ SURG 1 BLK 1SPC: CPT | Mod: 26

## 2018-09-24 PROCEDURE — 88307 TISSUE EXAM BY PATHOLOGIST: CPT | Mod: 26

## 2018-09-24 RX ORDER — FENTANYL CITRATE 50 UG/ML
25 INJECTION INTRAVENOUS
Qty: 0 | Refills: 0 | Status: DISCONTINUED | OUTPATIENT
Start: 2018-09-24 | End: 2018-09-24

## 2018-09-24 RX ORDER — DOCUSATE SODIUM 100 MG
100 CAPSULE ORAL THREE TIMES A DAY
Qty: 0 | Refills: 0 | Status: DISCONTINUED | OUTPATIENT
Start: 2018-09-24 | End: 2018-09-26

## 2018-09-24 RX ORDER — HYDROMORPHONE HYDROCHLORIDE 2 MG/ML
0.5 INJECTION INTRAMUSCULAR; INTRAVENOUS; SUBCUTANEOUS
Qty: 0 | Refills: 0 | Status: DISCONTINUED | OUTPATIENT
Start: 2018-09-24 | End: 2018-09-24

## 2018-09-24 RX ORDER — ONDANSETRON 8 MG/1
4 TABLET, FILM COATED ORAL EVERY 6 HOURS
Qty: 0 | Refills: 0 | Status: DISCONTINUED | OUTPATIENT
Start: 2018-09-24 | End: 2018-09-26

## 2018-09-24 RX ORDER — SODIUM CHLORIDE 9 MG/ML
1000 INJECTION, SOLUTION INTRAVENOUS
Qty: 0 | Refills: 0 | Status: DISCONTINUED | OUTPATIENT
Start: 2018-09-24 | End: 2018-09-24

## 2018-09-24 RX ORDER — TOBRAMYCIN 0.3 %
1 DROPS OPHTHALMIC (EYE) EVERY 4 HOURS
Qty: 0 | Refills: 0 | Status: COMPLETED | OUTPATIENT
Start: 2018-09-24 | End: 2018-09-25

## 2018-09-24 RX ORDER — METOPROLOL TARTRATE 50 MG
50 TABLET ORAL DAILY
Qty: 0 | Refills: 0 | Status: DISCONTINUED | OUTPATIENT
Start: 2018-09-24 | End: 2018-09-26

## 2018-09-24 RX ORDER — AMLODIPINE BESYLATE 2.5 MG/1
2.5 TABLET ORAL DAILY
Qty: 0 | Refills: 0 | Status: DISCONTINUED | OUTPATIENT
Start: 2018-09-24 | End: 2018-09-26

## 2018-09-24 RX ORDER — PANTOPRAZOLE SODIUM 20 MG/1
40 TABLET, DELAYED RELEASE ORAL
Qty: 0 | Refills: 0 | Status: DISCONTINUED | OUTPATIENT
Start: 2018-09-24 | End: 2018-09-26

## 2018-09-24 RX ORDER — ONDANSETRON 8 MG/1
4 TABLET, FILM COATED ORAL ONCE
Qty: 0 | Refills: 0 | Status: DISCONTINUED | OUTPATIENT
Start: 2018-09-24 | End: 2018-09-24

## 2018-09-24 RX ORDER — OXYCODONE HYDROCHLORIDE 5 MG/1
5 TABLET ORAL EVERY 4 HOURS
Qty: 0 | Refills: 0 | Status: DISCONTINUED | OUTPATIENT
Start: 2018-09-24 | End: 2018-09-26

## 2018-09-24 RX ORDER — ACETAMINOPHEN 500 MG
650 TABLET ORAL EVERY 6 HOURS
Qty: 0 | Refills: 0 | Status: DISCONTINUED | OUTPATIENT
Start: 2018-09-24 | End: 2018-09-26

## 2018-09-24 RX ORDER — HEPARIN SODIUM 5000 [USP'U]/ML
5000 INJECTION INTRAVENOUS; SUBCUTANEOUS EVERY 8 HOURS
Qty: 0 | Refills: 0 | Status: DISCONTINUED | OUTPATIENT
Start: 2018-09-24 | End: 2018-09-26

## 2018-09-24 RX ORDER — SODIUM CHLORIDE 9 MG/ML
1000 INJECTION, SOLUTION INTRAVENOUS
Qty: 0 | Refills: 0 | Status: DISCONTINUED | OUTPATIENT
Start: 2018-09-24 | End: 2018-09-25

## 2018-09-24 RX ORDER — SENNA PLUS 8.6 MG/1
2 TABLET ORAL AT BEDTIME
Qty: 0 | Refills: 0 | Status: DISCONTINUED | OUTPATIENT
Start: 2018-09-24 | End: 2018-09-26

## 2018-09-24 RX ORDER — OXYCODONE HYDROCHLORIDE 5 MG/1
10 TABLET ORAL EVERY 4 HOURS
Qty: 0 | Refills: 0 | Status: DISCONTINUED | OUTPATIENT
Start: 2018-09-24 | End: 2018-09-26

## 2018-09-24 RX ORDER — METOPROLOL TARTRATE 50 MG
25 TABLET ORAL AT BEDTIME
Qty: 0 | Refills: 0 | Status: DISCONTINUED | OUTPATIENT
Start: 2018-09-24 | End: 2018-09-26

## 2018-09-24 RX ORDER — ATORVASTATIN CALCIUM 80 MG/1
20 TABLET, FILM COATED ORAL AT BEDTIME
Qty: 0 | Refills: 0 | Status: DISCONTINUED | OUTPATIENT
Start: 2018-09-24 | End: 2018-09-26

## 2018-09-24 RX ORDER — ASPIRIN/CALCIUM CARB/MAGNESIUM 324 MG
81 TABLET ORAL DAILY
Qty: 0 | Refills: 0 | Status: DISCONTINUED | OUTPATIENT
Start: 2018-09-24 | End: 2018-09-26

## 2018-09-24 RX ADMIN — Medication 25 MILLIGRAM(S): at 21:04

## 2018-09-24 RX ADMIN — SENNA PLUS 2 TABLET(S): 8.6 TABLET ORAL at 21:05

## 2018-09-24 RX ADMIN — HYDROMORPHONE HYDROCHLORIDE 0.5 MILLIGRAM(S): 2 INJECTION INTRAMUSCULAR; INTRAVENOUS; SUBCUTANEOUS at 16:35

## 2018-09-24 RX ADMIN — Medication 100 MILLIGRAM(S): at 21:05

## 2018-09-24 RX ADMIN — Medication 1 DROP(S): at 16:29

## 2018-09-24 RX ADMIN — Medication 81 MILLIGRAM(S): at 21:05

## 2018-09-24 RX ADMIN — ATORVASTATIN CALCIUM 20 MILLIGRAM(S): 80 TABLET, FILM COATED ORAL at 21:05

## 2018-09-24 RX ADMIN — Medication 1 DROP(S): at 16:15

## 2018-09-24 RX ADMIN — OXYCODONE HYDROCHLORIDE 10 MILLIGRAM(S): 5 TABLET ORAL at 19:59

## 2018-09-24 RX ADMIN — Medication 1 DROP(S): at 19:58

## 2018-09-24 RX ADMIN — HEPARIN SODIUM 5000 UNIT(S): 5000 INJECTION INTRAVENOUS; SUBCUTANEOUS at 21:05

## 2018-09-24 RX ADMIN — SODIUM CHLORIDE 125 MILLILITER(S): 9 INJECTION, SOLUTION INTRAVENOUS at 15:22

## 2018-09-24 RX ADMIN — OXYCODONE HYDROCHLORIDE 10 MILLIGRAM(S): 5 TABLET ORAL at 19:05

## 2018-09-24 RX ADMIN — HYDROMORPHONE HYDROCHLORIDE 0.5 MILLIGRAM(S): 2 INJECTION INTRAMUSCULAR; INTRAVENOUS; SUBCUTANEOUS at 16:30

## 2018-09-24 RX ADMIN — HYDROMORPHONE HYDROCHLORIDE 0.5 MILLIGRAM(S): 2 INJECTION INTRAMUSCULAR; INTRAVENOUS; SUBCUTANEOUS at 16:15

## 2018-09-24 NOTE — PROGRESS NOTE ADULT - SUBJECTIVE AND OBJECTIVE BOX
Called to bedside by RN for Pt. with complaint of "something in my eye"  Pt. denies diplopia, blurry vision,  or photophobia.  Eye irrigated with normal saline without alleviation of symptoms.    PE:  OS: PERRL, EOMI, sclera white, conjunctiva pink, no foreign object seen. + corneal abrasion seen at the 9-3 o'clock position across the pupil.  A/P  Corneal abrasion OS  1) tetracaine opht. Solution 0.5% one drop to the affected eye times one  2) Tobramycin opht. solution 0.3% one drop to the affected eye every four hours times three doses  3) F/U with opht. if symptoms persist >24 hours  4) Case D/W Dr. Gupta

## 2018-09-24 NOTE — BRIEF OPERATIVE NOTE - PROCEDURE
<<-----Click on this checkbox to enter Procedure Partial nephrectomy  09/24/2018    Active  [id number]

## 2018-09-24 NOTE — PROGRESS NOTE ADULT - SUBJECTIVE AND OBJECTIVE BOX
Post op check    This 62 yo M is s/p R. lap partial neph  PMH: CAD; stents; gout; HTN; chol  Has L. corneal abrasion; patch on; drops given  Pt is awake and alert  Has no c/o  Afeb 136/66 69 98%RA    Abd- soft; appropriately tender             wounds C&D  Connolly 50 cc /1st hr  KARIN 30

## 2018-09-25 DIAGNOSIS — I10 ESSENTIAL (PRIMARY) HYPERTENSION: ICD-10-CM

## 2018-09-25 DIAGNOSIS — M1A.9XX1 CHRONIC GOUT, UNSPECIFIED, WITH TOPHUS (TOPHI): ICD-10-CM

## 2018-09-25 DIAGNOSIS — I25.10 ATHEROSCLEROTIC HEART DISEASE OF NATIVE CORONARY ARTERY WITHOUT ANGINA PECTORIS: ICD-10-CM

## 2018-09-25 DIAGNOSIS — Z29.9 ENCOUNTER FOR PROPHYLACTIC MEASURES, UNSPECIFIED: ICD-10-CM

## 2018-09-25 DIAGNOSIS — R56.9 UNSPECIFIED CONVULSIONS: ICD-10-CM

## 2018-09-25 LAB
BLD GP AB SCN SERPL QL: NEGATIVE — SIGNIFICANT CHANGE UP
BUN SERPL-MCNC: 22 MG/DL — SIGNIFICANT CHANGE UP (ref 7–23)
CALCIUM SERPL-MCNC: 8.7 MG/DL — SIGNIFICANT CHANGE UP (ref 8.4–10.5)
CHLORIDE SERPL-SCNC: 100 MMOL/L — SIGNIFICANT CHANGE UP (ref 98–107)
CO2 SERPL-SCNC: 22 MMOL/L — SIGNIFICANT CHANGE UP (ref 22–31)
CREAT SERPL-MCNC: 2 MG/DL — HIGH (ref 0.5–1.3)
GLUCOSE BLDC GLUCOMTR-MCNC: 129 MG/DL — HIGH (ref 70–99)
GLUCOSE SERPL-MCNC: 133 MG/DL — HIGH (ref 70–99)
HCT VFR BLD CALC: 41.5 % — SIGNIFICANT CHANGE UP (ref 39–50)
HGB BLD-MCNC: 13.6 G/DL — SIGNIFICANT CHANGE UP (ref 13–17)
MAGNESIUM SERPL-MCNC: 1.8 MG/DL — SIGNIFICANT CHANGE UP (ref 1.6–2.6)
MCHC RBC-ENTMCNC: 28.9 PG — SIGNIFICANT CHANGE UP (ref 27–34)
MCHC RBC-ENTMCNC: 32.8 % — SIGNIFICANT CHANGE UP (ref 32–36)
MCV RBC AUTO: 88.1 FL — SIGNIFICANT CHANGE UP (ref 80–100)
NRBC # FLD: 0 — SIGNIFICANT CHANGE UP
PHOSPHATE SERPL-MCNC: 3 MG/DL — SIGNIFICANT CHANGE UP (ref 2.5–4.5)
PLATELET # BLD AUTO: 197 K/UL — SIGNIFICANT CHANGE UP (ref 150–400)
PMV BLD: 9.7 FL — SIGNIFICANT CHANGE UP (ref 7–13)
POTASSIUM SERPL-MCNC: 4.2 MMOL/L — SIGNIFICANT CHANGE UP (ref 3.5–5.3)
POTASSIUM SERPL-SCNC: 4.2 MMOL/L — SIGNIFICANT CHANGE UP (ref 3.5–5.3)
RBC # BLD: 4.71 M/UL — SIGNIFICANT CHANGE UP (ref 4.2–5.8)
RBC # FLD: 13.3 % — SIGNIFICANT CHANGE UP (ref 10.3–14.5)
RH IG SCN BLD-IMP: POSITIVE — SIGNIFICANT CHANGE UP
SODIUM SERPL-SCNC: 138 MMOL/L — SIGNIFICANT CHANGE UP (ref 135–145)
WBC # BLD: 12.93 K/UL — HIGH (ref 3.8–10.5)
WBC # FLD AUTO: 12.93 K/UL — HIGH (ref 3.8–10.5)

## 2018-09-25 PROCEDURE — 93010 ELECTROCARDIOGRAM REPORT: CPT

## 2018-09-25 PROCEDURE — 99223 1ST HOSP IP/OBS HIGH 75: CPT

## 2018-09-25 RX ORDER — ACETAMINOPHEN 500 MG
1000 TABLET ORAL ONCE
Qty: 0 | Refills: 0 | Status: COMPLETED | OUTPATIENT
Start: 2018-09-25 | End: 2018-09-25

## 2018-09-25 RX ORDER — SODIUM CHLORIDE 9 MG/ML
1000 INJECTION, SOLUTION INTRAVENOUS
Qty: 0 | Refills: 0 | Status: DISCONTINUED | OUTPATIENT
Start: 2018-09-25 | End: 2018-09-26

## 2018-09-25 RX ADMIN — SODIUM CHLORIDE 75 MILLILITER(S): 9 INJECTION, SOLUTION INTRAVENOUS at 21:40

## 2018-09-25 RX ADMIN — AMLODIPINE BESYLATE 2.5 MILLIGRAM(S): 2.5 TABLET ORAL at 05:55

## 2018-09-25 RX ADMIN — Medication 100 MILLIGRAM(S): at 05:18

## 2018-09-25 RX ADMIN — Medication 10 MILLIGRAM(S): at 18:35

## 2018-09-25 RX ADMIN — Medication 650 MILLIGRAM(S): at 07:43

## 2018-09-25 RX ADMIN — SODIUM CHLORIDE 75 MILLILITER(S): 9 INJECTION, SOLUTION INTRAVENOUS at 13:04

## 2018-09-25 RX ADMIN — HEPARIN SODIUM 5000 UNIT(S): 5000 INJECTION INTRAVENOUS; SUBCUTANEOUS at 05:18

## 2018-09-25 RX ADMIN — Medication 400 MILLIGRAM(S): at 18:06

## 2018-09-25 RX ADMIN — Medication 81 MILLIGRAM(S): at 18:35

## 2018-09-25 RX ADMIN — SODIUM CHLORIDE 75 MILLILITER(S): 9 INJECTION, SOLUTION INTRAVENOUS at 18:35

## 2018-09-25 RX ADMIN — PANTOPRAZOLE SODIUM 40 MILLIGRAM(S): 20 TABLET, DELAYED RELEASE ORAL at 05:18

## 2018-09-25 RX ADMIN — HEPARIN SODIUM 5000 UNIT(S): 5000 INJECTION INTRAVENOUS; SUBCUTANEOUS at 21:39

## 2018-09-25 RX ADMIN — Medication 100 MILLIGRAM(S): at 13:03

## 2018-09-25 RX ADMIN — Medication 650 MILLIGRAM(S): at 08:50

## 2018-09-25 RX ADMIN — SODIUM CHLORIDE 125 MILLILITER(S): 9 INJECTION, SOLUTION INTRAVENOUS at 05:19

## 2018-09-25 RX ADMIN — ATORVASTATIN CALCIUM 20 MILLIGRAM(S): 80 TABLET, FILM COATED ORAL at 21:39

## 2018-09-25 RX ADMIN — Medication 50 MILLIGRAM(S): at 05:18

## 2018-09-25 RX ADMIN — Medication 25 MILLIGRAM(S): at 21:39

## 2018-09-25 RX ADMIN — Medication 1 DROP(S): at 00:43

## 2018-09-25 RX ADMIN — HEPARIN SODIUM 5000 UNIT(S): 5000 INJECTION INTRAVENOUS; SUBCUTANEOUS at 13:03

## 2018-09-25 RX ADMIN — Medication 100 MILLIGRAM(S): at 21:39

## 2018-09-25 RX ADMIN — Medication 1000 MILLIGRAM(S): at 18:31

## 2018-09-25 RX ADMIN — Medication 10 MILLIGRAM(S): at 07:35

## 2018-09-25 NOTE — CONSULT NOTE ADULT - SUBJECTIVE AND OBJECTIVE BOX
CC: partial nephrectomy  HPI:  64y/o male presents for preop eval for scheduled right laparoscopic partial nephrectomy on 2018.  Pt states few months ago experienced abdominal pain, evaluated by pcp.  Ct scan done  and revealed density in right Kidney.  Referred  to urology MRI done.  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 Patient s/p partial right nephrectomy yesterday. Seen and evaluated today. Patient stated no flatus or BM yet with abodminal bloating.  Denied chest pain, SOB, n/v/d, leg swelling, fever, chills.    Upon finishing exam, wife started to tell me how patient became unconsious overnight for around 20-30 seconds.  As she was telling me the story, the patient became unconscious in front of me.  In seat, eyes glazed over, breathing rapidly, face flushed, unaware of surroundings with increased tonicity of extremities.  This lasted approxiamtely 20-30 seconds in which a rapid response was called.  This then resolved and patient back to normal state, did not know what occurred, stated felt better than prior. Prior to episode felt nausous.  No visual changes.      upon further history had febrile seizures as a child at 1 years old.  Had 1 similar episode approixmately 8 years ago at home after being sick, no intervention, did not follow up for treatment.        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     Allergies    No Known Allergies    Intolerances        Social History:  Never smoker, occasional ETOH (1 drink per week, last drink was a week ago with a sip of wine).  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     FAMILY HISTORY:  Family history of colon cancer (Father, Mother)  Family history of prostate cancer (Father, Sibling): father   Family history of heart attack (Father)      MEDICATIONS  (STANDING):  amLODIPine   Tablet 2.5 milliGRAM(s) Oral daily  aspirin enteric coated 81 milliGRAM(s) Oral daily  atorvastatin 20 milliGRAM(s) Oral at bedtime  dextrose 5% + sodium chloride 0.45%. 1000 milliLiter(s) (75 mL/Hr) IV Continuous <Continuous>  docusate sodium 100 milliGRAM(s) Oral three times a day  heparin  Injectable 5000 Unit(s) SubCutaneous every 8 hours  metoprolol tartrate 50 milliGRAM(s) Oral daily  metoprolol tartrate 25 milliGRAM(s) Oral at bedtime  pantoprazole    Tablet 40 milliGRAM(s) Oral before breakfast    MEDICATIONS  (PRN):  acetaminophen   Tablet .. 650 milliGRAM(s) Oral every 6 hours PRN Temp greater or equal to 38C (100.4F), Mild Pain (1 - 3)  ondansetron Injectable 4 milliGRAM(s) IV Push every 6 hours PRN Nausea and/or Vomiting  oxyCODONE    IR 5 milliGRAM(s) Oral every 4 hours PRN Moderate Pain (4 - 6)  oxyCODONE    IR 10 milliGRAM(s) Oral every 4 hours PRN Severe Pain (7 - 10)  senna 2 Tablet(s) Oral at bedtime PRN Constipation      HOME MESD: ASA 81/vnflox90/amlodipine 2.5mg/bidfjnm84/lopressor 50mg QAM, 25QPM/ uloric    T(C): 36.3 (18 @ 12:12), Max: 37.3 (18 @ 05:17)  HR: 66 (18 @ 12:12) (65 - 87)  BP: 123/73 (18 @ 12:12) (112/64 - 163/75)  RR: 18 (18 @ 12:12) (12 - 20)  SpO2: 95% (18 @ 12:12) (95% - 99%)  CAPILLARY BLOOD GLUCOSE      POCT Blood Glucose.: 129 mg/dL (25 Sep 2018 11:09)    I&O's Summary    24 Sep 2018 07:  -  25 Sep 2018 07:00  --------------------------------------------------------  IN: 300 mL / OUT: 1112.5 mL / NET: -812.5 mL    25 Sep 2018 07:  -  25 Sep 2018 14:46  --------------------------------------------------------  IN: 0 mL / OUT: 315 mL / NET: -315 mL        PHYSICAL EXAM: (prior to event and after event)  GENERAL: NAD, well-developed  HEAD:  Atraumatic, Normocephalic  EYES: EOMI, PERRLA, conjunctiva and sclera clear  NECK: Supple, No JVD  CHEST/LUNG: Clear to auscultation bilaterally; No wheeze  HEART: Regular rate and rhythm; No murmurs, rubs, or gallops  ABDOMEN: Soft, distended. Tympanic BS and percussion.  TTP at surgical site.   EXTREMITIES:  2+ Peripheral Pulses, No clubbing, cyanosis, or edema  PSYCH: AAOx3  NEUROLOGY: CN II-XII intact. 5/5 UE and LE strenght. Gross sensation intact.   SKIN: No rashes or lesions    LABS:                        13.6   12.93 )-----------( 197      ( 25 Sep 2018 11:19 )             41.5         138  |  100  |  22  ----------------------------<  133<H>  4.2   |  22  |  2.00<H>    Ca    8.7      25 Sep 2018 11:19  Phos  3.0       Mg     1.8                     RADIOLOGY & ADDITIONAL TESTS:    Imaging Personally Reviewed:    Consultant(s) Notes Reviewed:      Care Discussed with Consultants/Other Providers: Urology, Cardiology CC: partial nephrectomy  HPI:  62y/o male presents for preop eval for scheduled right laparoscopic partial nephrectomy on 2018.  Pt states few months ago experienced abdominal pain, evaluated by pcp.  Ct scan done  and revealed density in right Kidney.  Referred  to urology MRI done.  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 Patient s/p partial right nephrectomy yesterday. Seen and evaluated today. Patient stated no flatus or BM yet with abodminal bloating.  Denied chest pain, SOB, n/v/d, leg swelling, fever, chills.    Upon finishing exam, wife started to tell me how patient became unconsious overnight for around 20-30 seconds.  As she was telling me the story, the patient became unconscious in front of me.  In seat, eyes glazed over, breathing rapidly, face flushed, unaware of surroundings with increased tonicity of extremities.  This lasted approxiamtely 20-30 seconds in which a rapid response was called.  This then resolved and patient back to normal state, did not know what occurred, stated felt better than prior. Prior to episode felt nausous.  No visual changes.      upon further history had febrile seizures as a child at 1 years old.  Had 1 similar episode approixmately 8 years ago at home after being sick, no intervention, did not follow up for treatment.        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     Allergies    No Known Allergies    Intolerances        Social History:  Never smoker, occasional ETOH (1 drink per week, last drink was a week ago with a sip of wine).  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     FAMILY HISTORY:  Family history of colon cancer (Father, Mother)  Family history of prostate cancer (Father, Sibling): father   Family history of heart attack (Father)      MEDICATIONS  (STANDING):  amLODIPine   Tablet 2.5 milliGRAM(s) Oral daily  aspirin enteric coated 81 milliGRAM(s) Oral daily  atorvastatin 20 milliGRAM(s) Oral at bedtime  dextrose 5% + sodium chloride 0.45%. 1000 milliLiter(s) (75 mL/Hr) IV Continuous <Continuous>  docusate sodium 100 milliGRAM(s) Oral three times a day  heparin  Injectable 5000 Unit(s) SubCutaneous every 8 hours  metoprolol tartrate 50 milliGRAM(s) Oral daily  metoprolol tartrate 25 milliGRAM(s) Oral at bedtime  pantoprazole    Tablet 40 milliGRAM(s) Oral before breakfast    MEDICATIONS  (PRN):  acetaminophen   Tablet .. 650 milliGRAM(s) Oral every 6 hours PRN Temp greater or equal to 38C (100.4F), Mild Pain (1 - 3)  ondansetron Injectable 4 milliGRAM(s) IV Push every 6 hours PRN Nausea and/or Vomiting  oxyCODONE    IR 5 milliGRAM(s) Oral every 4 hours PRN Moderate Pain (4 - 6)  oxyCODONE    IR 10 milliGRAM(s) Oral every 4 hours PRN Severe Pain (7 - 10)  senna 2 Tablet(s) Oral at bedtime PRN Constipation      HOME MESD: ASA 81/eardeq32/amlodipine 2.5mg/vwjwuyi93/lopressor 50mg QAM, 25QPM/ uloric    T(C): 36.3 (18 @ 12:12), Max: 37.3 (18 @ 05:17)  HR: 66 (18 @ 12:12) (65 - 87)  BP: 123/73 (18 @ 12:12) (112/64 - 163/75)  RR: 18 (18 @ 12:12) (12 - 20)  SpO2: 95% (18 @ 12:12) (95% - 99%)  CAPILLARY BLOOD GLUCOSE      POCT Blood Glucose.: 129 mg/dL (25 Sep 2018 11:09)    I&O's Summary    24 Sep 2018 07:  -  25 Sep 2018 07:00  --------------------------------------------------------  IN: 300 mL / OUT: 1112.5 mL / NET: -812.5 mL    25 Sep 2018 07:  -  25 Sep 2018 14:46  --------------------------------------------------------  IN: 0 mL / OUT: 315 mL / NET: -315 mL        PHYSICAL EXAM: (prior to event and after event)  GENERAL: NAD, well-developed  HEAD:  Atraumatic, Normocephalic  EYES: EOMI, PERRLA, conjunctiva and sclera clear  NECK: Supple, No JVD  CHEST/LUNG: Clear to auscultation bilaterally; No wheeze  HEART: Regular rate and rhythm; No murmurs, rubs, or gallops  ABDOMEN: Soft, distended. Tympanic BS and percussion.  TTP at surgical site.   EXTREMITIES:  2+ Peripheral Pulses, No clubbing, cyanosis, or edema  PSYCH: AAOx3  NEUROLOGY: CN II-XII intact. 5/5 UE and LE strenght. Gross sensation intact.   SKIN: No rashes or lesions    LABS:                        13.6   12.93 )-----------( 197      ( 25 Sep 2018 11:19 )             41.5         138  |  100  |  22  ----------------------------<  133<H>  4.2   |  22  |  2.00<H>    Ca    8.7      25 Sep 2018 11:19  Phos  3.0       Mg     1.8                     RADIOLOGY & ADDITIONAL TESTS:    Imaging Personally Reviewed:  ECG personally reviewed:  NSR with TWI in lead III, aVF, no change from pre-surgical testing ECG in chart.   Consultant(s) Notes Reviewed:      Care Discussed with Consultants/Other Providers: Urology, Cardiology

## 2018-09-25 NOTE — CONSULT NOTE ADULT - ATTENDING COMMENTS
Patient seen and examined with neurology team and above note reviewed and I agree with assessment and plan as outlined. Patient reports that he had febrile seizures as a child but was never on medications for them.   He also reports having syncopal episodes int he past as well but was never fully worked up neurologically. He had another similar event post operatively of blank stare and transient alteration of awareness but no urine incontinence or tongue biting.  He is feeling well now and neurologic exam is normal and no deficits and routine EEG reviewed and was completely normal.  Unclear if episode was vasovagal due to pain, or from certain meds or BPs changes.    Would do MRI brain as outpatient and neurology follow up as outpatient and no further inpatient workup and would not start any anti seizure meds at this time.   All questions answered.

## 2018-09-25 NOTE — CONSULT NOTE ADULT - SUBJECTIVE AND OBJECTIVE BOX
CHIEF COMPLAINT:  syncope psot op day 1 removal partial of kidney  HISTORY OF PRESENT ILLNESS:  HPI:  62y/o male presents for preop eval for scheduled right laparoscopic partial nephrectomy on 2018.  Pt states few months ago experienced abdominal pain, evaluated by pcp.  Ct scan done  and revealed density in right Kidney.  Referred  to urology MRI done.  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 650 milliGRAM(s) Oral every 6 hours PRN  ondansetron Injectable 4 milliGRAM(s) IV Push every 6 hours PRN  oxyCODONE    IR 5 milliGRAM(s) Oral every 4 hours PRN  oxyCODONE    IR 10 milliGRAM(s) Oral every 4 hours PRN    docusate sodium 100 milliGRAM(s) Oral three times a day  pantoprazole    Tablet 40 milliGRAM(s) Oral before breakfast  senna 2 Tablet(s) Oral at bedtime PRN    atorvastatin 20 milliGRAM(s) Oral at bedtime    dextrose 5% + sodium chloride 0.45%. 1000 milliLiter(s) IV Continuous <Continuous>      FAMILY HISTORY:  Family history of colon cancer (Father, Mother)  Family history of prostate cancer (Father, Sibling): father   Family history of heart attack (Father)      Allergies    No Known Allergies    Intolerances    	      PHYSICAL EXAM:  T(C): 37.2 (18 @ 16:32), Max: 37.3 (18 @ 05:17)  HR: 90 (18 @ 16:32) (66 - 90)  BP: 153/76 (18 @ 16:32) (112/64 - 163/75)  RR: 18 (18 @ 16:32) (12 - 18)  SpO2: 97% (18 @ 16:32) (95% - 99%)  Wt(kg): --  I&O's Summary    24 Sep 2018 07:  -  25 Sep 2018 07:00  --------------------------------------------------------  IN: 300 mL / OUT: 1112.5 mL / NET: -812.5 mL    25 Sep 2018 07:  -  25 Sep 2018 16:44  --------------------------------------------------------  IN: 0 mL / OUT: 315 mL / NET: -315 mL        Appearance: Normal	  HEENT:   Normal oral mucosa, PERRL, EOMI	  Cardiovascular: Normal S1 S2, No JVD, No murmurs  Respiratory: Lungs clear to auscultation	  Psychiatry: A & O x 3, Mood & affect appropriate  Gastrointestinal:  Soft, Non-tender, + BS	  Skin: No rashes, No ecchymoses, No cyanosis	  Neurologic: Non-focal  Extremities: No clubbing, cyanosis or edema  Vascular: Peripheral pulses palpable 2+ bilaterally    TELEMETRY: 	    ECG:  NSR inferior wall Mi age indeterminate no change	  RADIOLOGY:  < from: CT Abdomen and Pelvis w/ Oral Cont and w/ IV Cont (18 @ 10:10) >  MPRESSION: A 2.5 cm right lower pole renal lesion worrisome for   neoplasm; MRI recommended for further evalu  	  	  LABS:	 	    CARDIAC MARKERS:                                  13.6   12.93 )-----------( 197      ( 25 Sep 2018 11:19 )             41.5         138  |  100  |  22  ----------------------------<  133<H>  4.2   |  22  |  2.00<H>    Ca    8.7      25 Sep 2018 11:19  Phos  3.0       Mg     1.8           proBNP:   Lipid Profile:   HgA1c:   TSH:

## 2018-09-25 NOTE — PROGRESS NOTE ADULT - SUBJECTIVE AND OBJECTIVE BOX
s/p Right lap partial nx.  POD 1  One episode of emesis yesterday.  Feels better now.  Pain controlled.    Vital Signs Last 24 Hrs  T(C): 37.3 (25 Sep 2018 05:17), Max: 37.3 (25 Sep 2018 05:17)  T(F): 99.2 (25 Sep 2018 05:17), Max: 99.2 (25 Sep 2018 05:17)  HR: 83 (25 Sep 2018 05:17) (65 - 87)  BP: 143/72 (25 Sep 2018 05:17) (129/75 - 163/75)  BP(mean): 87 (24 Sep 2018 15:05) (86 - 87)  RR: 17 (25 Sep 2018 05:17) (12 - 20)  SpO2: 98% (25 Sep 2018 05:17) (96% - 99%)    I&O's Detail    24 Sep 2018 07:01  -  25 Sep 2018 06:53  --------------------------------------------------------  IN:    lactated ringers.: 250 mL    Oral Fluid: 50 mL  Total IN: 300 mL    OUT:    Bulb: 272.5 mL    Indwelling Catheter - Urethral: 840 mL  Total OUT: 1112.5 mL    Total NET: -812.5 mL    Abdomen soft, mild distended, minimal tender  Connolly: urine clear, removed this am.

## 2018-09-25 NOTE — CONSULT NOTE ADULT - ASSESSMENT
62 yo male PMHD CAD s/p PCI 2009, febrile infantile seizures, HTN admitted for partial nephrectomy POD#1 c/b seizure vs. convulsive syncope

## 2018-09-25 NOTE — CONSULT NOTE ADULT - PROBLEM SELECTOR RECOMMENDATION 3
POD#1 of partial nephrectomy  -no faltus yet.  -post-op care as per urology with pain and bowel regimen.

## 2018-09-25 NOTE — PROGRESS NOTE ADULT - SUBJECTIVE AND OBJECTIVE BOX
POD #1    Afeb 144/65 84 98%RA  L. eye abrasion improved  Pt has no c/o  Abd- soft NT ND; no flatus    wounds C&D    Mohsen 375  KARIN 117

## 2018-09-25 NOTE — CONSULT NOTE ADULT - PROBLEM SELECTOR RECOMMENDATION 9
patient with witnessed seizure vs. convulsive syncope  -more likely seizure based on presentation, but cannot r/o convulsive syncope either in setting of vasovagal vs. arrhythmogenic.  -no post-ictal state, but had short prodrome, hypertonicity.  -neuro and cards consulted  -moved patient to telemetry to monitor  -for now even though posisble second episode, will hold off on AED until neuro sees patient. If another event, would start keppra load.  -check labs for electrolyte abnormalities  -EEG ordered.

## 2018-09-25 NOTE — CONSULT NOTE ADULT - ASSESSMENT
1. recurrent episoded post op day 1 nausea diaphoresis and syncope probably vasovagal episodes  2. no  evidence of arrythmia  3. doubt seizure activity  4. chronic ischemic heart disease stable  5. sue -?dehydration (creatinine 2)  Recommend  monitor  heart rate  orthostatic BP  keep hydrated  will follow  Micheal Betancourt MD

## 2018-09-25 NOTE — CONSULT NOTE ADULT - SUBJECTIVE AND OBJECTIVE BOX
JOSE VIRKPatient is a 63y old  Male who presents with a chief complaint of Right kidney mass (25 Sep 2018 06:52)      HPI:  64y/o male presents for preop eval for scheduled right laparoscopic partial nephrectomy on 2018.  Pt states few months ago experienced abdominal pain, evaluated by pcp.  Ct scan done  and revealed density in right Kidney.  Referred  to urology MRI done.  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 (13 Sep 2018 12:36)    Neurology consulted for multiple episodes of unresponsiveness and staring. Per wife at beside first episode occurred two nights ago while she was speaking to him. He suddenly stopped, his eyes rolled back and he didn't respond to her for about 20-30 seconds. The second pt remembers that he was feeling very nauseous and gaseous with abdominal pain and the next thing he remembers he woke and he had vomited on himself and he had to call the nurse in. The last episode was seen by the hospitalist who while talking to him saw that he started to stare off and became unresponsive even with vigorous stimulation, again lasting about 30 seconds. The next thing the pt remembers is multiple voices around him as they had called an RRT. He does not remembers the episodes specifically. There is no post event confusion, no tongue biting, no incontinence. Per wife pt had similar events 10 yrs ago that when he also hadvomiting and was febrile. At that time had about 2 events that was witnessed by EMS. He was monitored inpatient without any further events. No other workup was done at that time. He has history of febrile seizures as a toddler. Son also has hx of febrile seizures.      MEDICATIONS  (STANDING):  amLODIPine   Tablet 2.5 milliGRAM(s) Oral daily  aspirin enteric coated 81 milliGRAM(s) Oral daily  atorvastatin 20 milliGRAM(s) Oral at bedtime  dextrose 5% + sodium chloride 0.45%. 1000 milliLiter(s) (75 mL/Hr) IV Continuous <Continuous>  docusate sodium 100 milliGRAM(s) Oral three times a day  heparin  Injectable 5000 Unit(s) SubCutaneous every 8 hours  metoprolol tartrate 50 milliGRAM(s) Oral daily  metoprolol tartrate 25 milliGRAM(s) Oral at bedtime  pantoprazole    Tablet 40 milliGRAM(s) Oral before breakfast    MEDICATIONS  (PRN):  acetaminophen   Tablet .. 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  Osteoarthritis  CAD (coronary artery disease)  Gout  Obesity  Hypertension  Hypercholesterolemia  History of foot surgery: right  H/O coronary angioplasty: 1 stent 2009  Arthroplasty, Knee: h/o left    FAMILY HISTORY:  Family history of colon cancer (Father, Mother)  Family history of prostate cancer (Father, Sibling): father   Family history of heart attack (Father)  febrile seizures as child   febrile seizures in son    Allergies: No Known Allergies    Intolerances: none    SHx - No smoking, No ETOH, No drug abuse      REVIEW OF SYSTEMS:    Constitutional: No fever, weight loss, or fatigue  Eyes: No eye pain, visual disturbances, or discharge  ENMT:  No difficulty hearing, tinnitus, vertigo; No sinus or throat pain  Neck: No pain or stiffness  Respiratory: No cough, wheezing, or shortness of breath  Cardiovascular: No chest pain, palpitations, or leg swelling  Gastrointestinal: No abdominal pain, nausea, vomiting, diarrhea or constipation.   Genitourinary: No dysuria, frequency, hematuria, or incontinence  Neurological: As per HPI  Skin: No itching, burning, rashes, or lesions   Endocrine: No heat or cold intolerance; No hair loss  Musculoskeletal: No joint pain or swelling; No muscle, back, or extremity pain  Psychiatric: No depression, anxiety, mood swings, or difficulty sleeping  Heme/Lymph: No easy bruising or bleeding; No enlarged glands      Vital Signs Last 24 Hrs  T(C): 36.3 (25 Sep 2018 12:12), Max: 37.3 (25 Sep 2018 05:17)  T(F): 97.4 (25 Sep 2018 12:12), Max: 99.2 (25 Sep 2018 05:17)  HR: 66 (25 Sep 2018 12:12) (65 - 87)  BP: 123/73 (25 Sep 2018 12:12) (112/64 - 163/75)  BP(mean): 87 (24 Sep 2018 15:05) (86 - 87)  RR: 18 (25 Sep 2018 12:12) (12 - 20)  SpO2: 95% (25 Sep 2018 12:12) (95% - 99%)    General Exam:   General appearance: No acute distress    Cardiac: RRR  Pulm:  breathing comfortably               Neurological Exam:  Mental Status: Orientated to self, date and place.  Attention intact.  No dysarthria. Speech fluent.  Cranial Nerves:   PERRL, EOMI, VFF, no nystagmus.    CN V1-3 intact to light touch .  No facial asymmetry.  Hearing intact bilaterally.  Tongue  midline.  Sternocleidomastoid and Trapezius intact bilaterally.    Motor:   Tone: normal.                  Strength:     [] Upper extremity                      Delt       Bicep    Tricep                                                  R         5/5        5/5        5/5       5/5                                               L          5/5        5/5        5/5       5/5  [] Lower extremity                       HF          KE          KF        DF         PF                                               R        5/        5/5        5/5       5/5       5/5                                               L         5/5        5/5       5/5       5/5        5/5             Dysmetria: None to finger-nose-finger or heel-shin-heel  No truncal ataxia.    Tremor: No resting, postural or action tremor.  No myoclonus.    Sensation: intact to light touch, pinprick, vibration and proprioception    Deep Tendon Reflexes:     Biceps          Triceps      BR        Patellar        Ankle         Babinski                                  R       2+                   2+           2+            2+               2+           downgoing                                  L        2+                  2+           2+            2+               2+           downgoing    Gait: deferred.      Other:        138  |  100  |  22  ----------------------------<  133<H>  4.2   |  22  |  2.00<H>    Ca    8.7      25 Sep 2018 11:19  Phos  3.0       Mg     1.8                                   13.6   12.93 )-----------( 197      ( 25 Sep 2018 11:19 )             41.5 JOSE VIRKPatient is a 63y old  Male who presents with a chief complaint of Right kidney mass (25 Sep 2018 06:52)      HPI:  62y/o male presents for preop eval for scheduled right laparoscopic partial nephrectomy on 2018.  Pt states few months ago experienced abdominal pain, evaluated by pcp.  Ct scan done  and revealed density in right Kidney.  Referred  to urology MRI done.  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 (13 Sep 2018 12:36)    Neurology consulted for multiple episodes of unresponsiveness and staring. Per wife at beside first episode occurred two nights ago while she was speaking to him. He suddenly stopped, his eyes rolled back and he didn't respond to her for about 20-30 seconds. The second pt remembers that he was feeling very nauseous and gaseous with abdominal pain and the next thing he remembers he woke and he had vomited on himself and he had to call the nurse in. The last episode was seen by the hospitalist who while talking to him saw that he started to stare off and became unresponsive even with vigorous stimulation, again lasting about 30 seconds. The next thing the pt remembers is multiple voices around him as they had called an RRT. He does not remembers the episodes specifically. There is no post event confusion, no tongue biting, no incontinence. Per wife pt had similar events 10 yrs ago that when he also hadvomiting and was febrile. At that time had about 2 events that was witnessed by EMS. He was monitored inpatient without any further events. No other workup was done at that time. He has history of febrile seizures as a toddler. Son also has hx of febrile seizures. No new medications. Pt had some pain medications 2 nights ago but none since other than tylenol.       MEDICATIONS  (STANDING):  amLODIPine   Tablet 2.5 milliGRAM(s) Oral daily  aspirin enteric coated 81 milliGRAM(s) Oral daily  atorvastatin 20 milliGRAM(s) Oral at bedtime  dextrose 5% + sodium chloride 0.45%. 1000 milliLiter(s) (75 mL/Hr) IV Continuous <Continuous>  docusate sodium 100 milliGRAM(s) Oral three times a day  heparin  Injectable 5000 Unit(s) SubCutaneous every 8 hours  metoprolol tartrate 50 milliGRAM(s) Oral daily  metoprolol tartrate 25 milliGRAM(s) Oral at bedtime  pantoprazole    Tablet 40 milliGRAM(s) Oral before breakfast    MEDICATIONS  (PRN):  acetaminophen   Tablet .. 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  Osteoarthritis  CAD (coronary artery disease)  Gout  Obesity  Hypertension  Hypercholesterolemia  History of foot surgery: right  H/O coronary angioplasty: 1 stent   Arthroplasty, Knee: h/o left    FAMILY HISTORY:  Family history of colon cancer (Father, Mother)  Family history of prostate cancer (Father, Sibling): father   Family history of heart attack (Father)  febrile seizures as child   febrile seizures in son    Allergies: No Known Allergies    Intolerances: none    SHx - No smoking, No ETOH, No drug abuse      REVIEW OF SYSTEMS:    Constitutional: No fever, weight loss, or fatigue  Eyes: No eye pain, visual disturbances, or discharge  ENMT:  No difficulty hearing, tinnitus, vertigo; No sinus or throat pain  Neck: No pain or stiffness  Respiratory: No cough, wheezing, or shortness of breath  Cardiovascular: No chest pain, palpitations, or leg swelling  Gastrointestinal: No abdominal pain, nausea, vomiting, diarrhea or constipation.   Genitourinary: No dysuria, frequency, hematuria, or incontinence  Neurological: As per HPI  Skin: No itching, burning, rashes, or lesions   Endocrine: No heat or cold intolerance; No hair loss  Musculoskeletal: No joint pain or swelling; No muscle, back, or extremity pain  Psychiatric: No depression, anxiety, mood swings, or difficulty sleeping  Heme/Lymph: No easy bruising or bleeding; No enlarged glands      Vital Signs Last 24 Hrs  T(C): 36.3 (25 Sep 2018 12:12), Max: 37.3 (25 Sep 2018 05:17)  T(F): 97.4 (25 Sep 2018 12:12), Max: 99.2 (25 Sep 2018 05:17)  HR: 66 (25 Sep 2018 12:12) (65 - 87)  BP: 123/73 (25 Sep 2018 12:12) (112/64 - 163/75)  BP(mean): 87 (24 Sep 2018 15:05) (86 - 87)  RR: 18 (25 Sep 2018 12:12) (12 - 20)  SpO2: 95% (25 Sep 2018 12:12) (95% - 99%)    General Exam:   General appearance: No acute distress    Cardiac: RRR  Pulm:  breathing comfortably               Neurological Exam:  Mental Status: Orientated to self, date and place.  Attention intact.  No dysarthria. Speech fluent.  Cranial Nerves:   PERRL, EOMI, VFF, no nystagmus.    CN V1-3 intact to light touch .  No facial asymmetry.  Hearing intact bilaterally.  Tongue  midline.  Sternocleidomastoid and Trapezius intact bilaterally.    Motor:   Tone: normal.                  Strength:     [] Upper extremity                      Delt       Bicep    Tricep                                                  R         5/5        5/5        5/5       5/5                                               L          5/5        5/5        5/5       5/5  [] Lower extremity                       HF          KE          KF        DF         PF                                               R        5/        5/5        5/5       5/5       5/5                                               L         5/5        5/5       5/5       5/5        5/5             Dysmetria: None to finger-nose-finger or heel-shin-heel  No truncal ataxia.    Tremor: No resting, postural or action tremor.  No myoclonus.    Sensation: intact to light touch, pinprick, vibration and proprioception    Deep Tendon Reflexes:     Biceps          Triceps      BR        Patellar        Ankle         Babinski                                  R       2+                   2+           2+            2+               2+           downgoing                                  L        2+                  2+           2+            2+               2+           downgoing    Gait: deferred.      Other:        138  |  100  |  22  ----------------------------<  133<H>  4.2   |  22  |  2.00<H>    Ca    8.7      25 Sep 2018 11:19  Phos  3.0       Mg     1.8                                   13.6   12.93 )-----------( 197      ( 25 Sep 2018 11:19 )             41.5

## 2018-09-25 NOTE — CONSULT NOTE ADULT - ASSESSMENT
64 y/o man with hx of 62 y/o man with hx of CAD s/p stents, HTN, HLD ,  POD 1 from Right partial nephrectomy for kidney mass p/w episodes of staring and unresponsiveness unaccompanied by shaking, post-ictal confusion, or incontinence with a hx of similar episodes in the past. Concern for possible focal seizures with loss of awareness although low likelihood.     Episodes usually take place in the setting of fever, abdominal discomfort or vomiting.    Would obtain routine EEG for now. Will consider longer study if pt has further episodes or routine is abnormal.   No indication for AED's at this time.   neuro checks

## 2018-09-26 ENCOUNTER — TRANSCRIPTION ENCOUNTER (OUTPATIENT)
Age: 63
End: 2018-09-26

## 2018-09-26 VITALS
TEMPERATURE: 98 F | RESPIRATION RATE: 18 BRPM | HEART RATE: 77 BPM | DIASTOLIC BLOOD PRESSURE: 69 MMHG | SYSTOLIC BLOOD PRESSURE: 124 MMHG | OXYGEN SATURATION: 97 %

## 2018-09-26 DIAGNOSIS — R40.20 UNSPECIFIED COMA: ICD-10-CM

## 2018-09-26 LAB
BUN SERPL-MCNC: 22 MG/DL — SIGNIFICANT CHANGE UP (ref 7–23)
CALCIUM SERPL-MCNC: 8.6 MG/DL — SIGNIFICANT CHANGE UP (ref 8.4–10.5)
CHLORIDE SERPL-SCNC: 100 MMOL/L — SIGNIFICANT CHANGE UP (ref 98–107)
CO2 SERPL-SCNC: 26 MMOL/L — SIGNIFICANT CHANGE UP (ref 22–31)
CREAT SERPL-MCNC: 2.18 MG/DL — HIGH (ref 0.5–1.3)
GLUCOSE SERPL-MCNC: 127 MG/DL — HIGH (ref 70–99)
HCT VFR BLD CALC: 41.6 % — SIGNIFICANT CHANGE UP (ref 39–50)
HGB BLD-MCNC: 13.5 G/DL — SIGNIFICANT CHANGE UP (ref 13–17)
MCHC RBC-ENTMCNC: 29 PG — SIGNIFICANT CHANGE UP (ref 27–34)
MCHC RBC-ENTMCNC: 32.5 % — SIGNIFICANT CHANGE UP (ref 32–36)
MCV RBC AUTO: 89.3 FL — SIGNIFICANT CHANGE UP (ref 80–100)
NRBC # FLD: 0 — SIGNIFICANT CHANGE UP
PLATELET # BLD AUTO: 163 K/UL — SIGNIFICANT CHANGE UP (ref 150–400)
PMV BLD: 10.2 FL — SIGNIFICANT CHANGE UP (ref 7–13)
POTASSIUM SERPL-MCNC: 4 MMOL/L — SIGNIFICANT CHANGE UP (ref 3.5–5.3)
POTASSIUM SERPL-SCNC: 4 MMOL/L — SIGNIFICANT CHANGE UP (ref 3.5–5.3)
RBC # BLD: 4.66 M/UL — SIGNIFICANT CHANGE UP (ref 4.2–5.8)
RBC # FLD: 13.4 % — SIGNIFICANT CHANGE UP (ref 10.3–14.5)
SODIUM SERPL-SCNC: 137 MMOL/L — SIGNIFICANT CHANGE UP (ref 135–145)
WBC # BLD: 8.98 K/UL — SIGNIFICANT CHANGE UP (ref 3.8–10.5)
WBC # FLD AUTO: 8.98 K/UL — SIGNIFICANT CHANGE UP (ref 3.8–10.5)

## 2018-09-26 PROCEDURE — 99222 1ST HOSP IP/OBS MODERATE 55: CPT | Mod: GC

## 2018-09-26 PROCEDURE — 95819 EEG AWAKE AND ASLEEP: CPT | Mod: 26

## 2018-09-26 PROCEDURE — 99233 SBSQ HOSP IP/OBS HIGH 50: CPT

## 2018-09-26 RX ORDER — ACETAMINOPHEN 500 MG
1000 TABLET ORAL ONCE
Qty: 0 | Refills: 0 | Status: COMPLETED | OUTPATIENT
Start: 2018-09-26 | End: 2018-09-26

## 2018-09-26 RX ORDER — OXYCODONE HYDROCHLORIDE 5 MG/1
2 TABLET ORAL
Qty: 40 | Refills: 0
Start: 2018-09-26 | End: 2018-09-30

## 2018-09-26 RX ORDER — CLOPIDOGREL BISULFATE 75 MG/1
1 TABLET, FILM COATED ORAL
Qty: 0 | Refills: 0 | COMMUNITY

## 2018-09-26 RX ADMIN — Medication 50 MILLIGRAM(S): at 05:41

## 2018-09-26 RX ADMIN — HEPARIN SODIUM 5000 UNIT(S): 5000 INJECTION INTRAVENOUS; SUBCUTANEOUS at 05:38

## 2018-09-26 RX ADMIN — Medication 100 MILLIGRAM(S): at 05:38

## 2018-09-26 RX ADMIN — PANTOPRAZOLE SODIUM 40 MILLIGRAM(S): 20 TABLET, DELAYED RELEASE ORAL at 05:38

## 2018-09-26 RX ADMIN — Medication 400 MILLIGRAM(S): at 00:50

## 2018-09-26 RX ADMIN — AMLODIPINE BESYLATE 2.5 MILLIGRAM(S): 2.5 TABLET ORAL at 05:38

## 2018-09-26 RX ADMIN — Medication 1000 MILLIGRAM(S): at 01:20

## 2018-09-26 NOTE — DISCHARGE NOTE ADULT - PATIENT PORTAL LINK FT
You can access the [facility], offered by [facility], by registering with the following website: [email or web address]

## 2018-09-26 NOTE — PROGRESS NOTE ADULT - ASSESSMENT
64 yo male PMHD CAD s/p PCI 2009, febrile infantile seizures, HTN admitted for partial nephrectomy POD#1 c/b seizure vs. convulsive syncope

## 2018-09-26 NOTE — DISCHARGE NOTE ADULT - MEDICATION SUMMARY - MEDICATIONS TO TAKE
I will START or STAY ON the medications listed below when I get home from the hospital:    oxyCODONE 5 mg oral tablet  -- 1-2 tab(s) by mouth every 6 hours, As Needed -Moderate Pain (4 - 6) MDD:8  -- Indication: For pain as needed    Aspir 81 oral delayed release tablet  -- 1 tab(s) by mouth once a day at night  -- Indication: For Home med    Zetia 10 mg oral tablet  -- 1 tab(s) by mouth once a day at night  -- Indication: For Home med    fluvastatin 80 mg oral tablet, extended release  -- 1 tab(s) by mouth once a day at night  -- Indication: For Home med    Uloric 40 mg oral tablet  -- 1 tab(s) by mouth once a day AM  -- Indication: For Home med    metoprolol tartrate 25 mg oral tablet  -- orally 2 tabs in Am 1 tab at night    -- Indication: For Home med    amLODIPine 2.5 mg oral tablet  -- 1 tab(s) by mouth once a day AM  -- Indication: For Home med    pantoprazole 40 mg oral delayed release tablet  -- 1 tab(s) by mouth once a day AM  -- Indication: For Home med    Vitamin D3 1000 intl units oral capsule  -- 1 cap(s) by mouth once a day AM  -- Indication: For Home med

## 2018-09-26 NOTE — DISCHARGE NOTE ADULT - CARE PLAN
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

## 2018-09-26 NOTE — DISCHARGE NOTE ADULT - PLAN OF CARE
partial neph as above; drink plenty of fluids; change dressing at drain site daily or as needed until dry; call office for appt; call for fever over 101/nausea/vomiting; avoid constipation while taking pain meds with fiber or stool softener

## 2018-09-26 NOTE — PROGRESS NOTE ADULT - PROBLEM SELECTOR PLAN 1
partial seizure vs. vasovagal syncope.  -given abdominal bloating and pain with nausea, more liekyl vasovagla, however had febriles seizures, an episode a few years ago not worked up.  -appreciate neuro recs, EEG performed no epileptiform waves.  -if neuro clears, ok for D/C, will defer to neuro re: ability to drive.

## 2018-09-26 NOTE — DISCHARGE NOTE ADULT - HOSPITAL COURSE
Pt had lap partial neph; in PACU wife noticed his eyes roll back and unresponsive a few seconds; then POD#1 hospitalist witnessed pt just staring, not responding, stiffening of arms and legs; rapid response was called; pt was fully awake by then, not post-ictal; claims it happened as a child with a fever, and a long time ago with nausea; not worked up;  pt sent to telemetry; no events; seen by his cardiologist, no cardiac w/u necessary; he stopped his plavix permanently;  seen by neuro; had EEG; normal;   KARIN removed today and home; voided well, passed gas and carolyn reg diet.

## 2018-09-26 NOTE — DISCHARGE NOTE ADULT - CONDITIONS AT DISCHARGE
Patient is AXOX4. Denied chest pain and shortness of breath. Vital signs remained stable. Pain was assessed and remained at an acceptable level with interventions. Incentive spirometer encouraged. Patient ambulated in hallway. Patient safety maintained through out shift. Surgical incision is d/c/i. KARIN drain was removed. IV's are being removed and patient is being discharged.

## 2018-09-26 NOTE — PROGRESS NOTE ADULT - SUBJECTIVE AND OBJECTIVE BOX
Subjective: Patient seen and examined. No new events except as noted.   patient feels well and has had no recurrence of sycope.  He had a bowel movement, no longer has  nausea or diaphoresis  Creatinine remains elevated post partial nephrectomy  There was no orthostatic hypotension  MEDICATIONS:  MEDICATIONS  (STANDING):  amLODIPine   Tablet 2.5 milliGRAM(s) Oral daily  aspirin enteric coated 81 milliGRAM(s) Oral daily  atorvastatin 20 milliGRAM(s) Oral at bedtime  dextrose 5% + sodium chloride 0.45%. 1000 milliLiter(s) (75 mL/Hr) IV Continuous <Continuous>  docusate sodium 100 milliGRAM(s) Oral three times a day  heparin  Injectable 5000 Unit(s) SubCutaneous every 8 hours  metoprolol tartrate 50 milliGRAM(s) Oral daily  metoprolol tartrate 25 milliGRAM(s) Oral at bedtime  pantoprazole    Tablet 40 milliGRAM(s) Oral before breakfast      PHYSICAL EXAM:  T(C): 36.9 (09-26-18 @ 08:05), Max: 37.6 (09-25-18 @ 20:21)  HR: 77 (09-26-18 @ 08:05) (77 - 90)  BP: 124/69 (09-26-18 @ 08:05) (124/69 - 153/76)  RR: 18 (09-26-18 @ 08:05) (18 - 18)  SpO2: 97% (09-26-18 @ 08:05) (95% - 97%)  Wt(kg): --  I&O's Summary    25 Sep 2018 07:01  -  26 Sep 2018 07:00  --------------------------------------------------------  IN: 775 mL / OUT: 1675 mL / NET: -900 mL    26 Sep 2018 07:01  -  26 Sep 2018 12:35  --------------------------------------------------------  IN: 0 mL / OUT: 450 mL / NET: -450 mL          Appearance: Normal	  HEENT:   Normal oral mucosa, PERRL, EOMI	  Cardiovascular: Normal S1 S2, No JVD, No murmurs ,  Respiratory: Lungs clear to auscultation, normal effort 	  Gastrointestinal:  Soft, Non-tender, + BS	  Skin: No rashes, No ecchymoses, No cyanosis, warm to touch  Musculoskeletal: Normal range of motion, normal strength  Psychiatry:  Mood & affect appropriate  Ext: No edema      LABS:    CARDIAC MARKERS:                                13.5   8.98  )-----------( 163      ( 26 Sep 2018 05:53 )             41.6     09-26    137  |  100  |  22  ----------------------------<  127<H>  4.0   |  26  |  2.18<H>    Ca    8.6      26 Sep 2018 05:53  Phos  3.0     09-25  Mg     1.8     09-25      proBNP:   Lipid Profile:   HgA1c:   TSH:           TELEMETRY: 	    ECG:  	  RADIOLOGY:   DIAGNOSTIC TESTING:  [ ] Echocardiogram:  [ ]  Catheterization:  [ ] Stress Test:    OTHER:

## 2018-09-26 NOTE — PROGRESS NOTE ADULT - SUBJECTIVE AND OBJECTIVE BOX
POD #2    Afeb 129/64 89 96%RA    Pt has no c/o; no further events (seizure activity); feels well    Abd- soft NT ND ; +flatus    wounds c&D    V 825  KARIN 108  Pt refuses EEG/work up of seizure

## 2018-09-26 NOTE — DISCHARGE NOTE ADULT - INSTRUCTIONS
as tolerated Keep surgical incisions clean and dry. For any signs of infection such as fever over 100.4F, new pain that cannot be controlled with ordered medication, unusual discharge, and or chills, please call your primary care provider or visit the emergency room.

## 2018-09-26 NOTE — PROGRESS NOTE ADULT - SUBJECTIVE AND OBJECTIVE BOX
Authored by Geo Olson, DO: Beeper#[phone]    JOSE VIRK  63y  Male      Patient is a 63y old  Male who presents with a chief complaint of s/p aprtial nephectomy syncop (26 Sep 2018 12:35)      INTERVAL HPI/OVERNIGHT EVENTS: No acute events overnight. No further episodes of LOC.  Discussed with patient, wants to be discharged, willling to wait for EEG. Offers no complaints. +Flatus, +BM.            T(C): 36.9 (09-26-18 @ 08:05), Max: 37.6 (09-25-18 @ 20:21)  HR: 77 (09-26-18 @ 08:05) (77 - 90)  BP: 124/69 (09-26-18 @ 08:05) (124/69 - 153/76)  RR: 18 (09-26-18 @ 08:05) (18 - 18)  SpO2: 97% (09-26-18 @ 08:05) (95% - 97%)  Wt(kg): --Vital Signs Last 24 Hrs  T(C): 36.9 (26 Sep 2018 08:05), Max: 37.6 (25 Sep 2018 20:21)  T(F): 98.5 (26 Sep 2018 08:05), Max: 99.7 (25 Sep 2018 20:21)  HR: 77 (26 Sep 2018 08:05) (77 - 90)  BP: 124/69 (26 Sep 2018 08:05) (124/69 - 153/76)  BP(mean): --  RR: 18 (26 Sep 2018 08:05) (18 - 18)  SpO2: 97% (26 Sep 2018 08:05) (95% - 97%)    PHYSICAL EXAM:  GENERAL: NAD, well-groomed, well-developed  HEENT: Atraumatic, normocephalic.  Anicteric sclera. moist mucous membranes.  CHEST/LUNG: Clear to percussion bilaterally; No rales, rhonchi, wheezing, or rubs  HEART: Regular rate and rhythm; No murmurs, rubs, or gallops  ABDOMEN: distended. +BS. Mild TTP lower quardants  EXTREMITIES:  2+ Peripheral Pulses, No clubbing, cyanosis, or edema  SKIN: No rashes   PSYCH: Alert & Oriented x3  NERVOUS SYSTEM:  ; Motor Strength 5/5 B/L upper and lower extremities.  Sensory intact    Consultant(s) Notes Reviewed:  [x ] YES  [ ] NO  Care Discussed with Consultants/Other Providers [ x] YES  [ ] NO: Urology PA, Neurology resident    LABS:                        13.5   8.98  )-----------( 163      ( 26 Sep 2018 05:53 )             41.6     09-26    137  |  100  |  22  ----------------------------<  127<H>  4.0   |  26  |  2.18<H>    Ca    8.6      26 Sep 2018 05:53  Phos  3.0     09-25  Mg     1.8     09-25          CAPILLARY BLOOD GLUCOSE                RADIOLOGY & ADDITIONAL TESTS:    Imaging Personally Reviewed:  [ ] YES  [ ] NO

## 2018-09-26 NOTE — DISCHARGE NOTE ADULT - MEDICATION SUMMARY - MEDICATIONS TO STOP TAKING
I will STOP taking the medications listed below when I get home from the hospital:    Plavix 75 mg oral tablet  -- 1 tab(s) by mouth once a day in Am   Please hold the medicine from 9/19/18 onwards for surgery.

## 2018-09-26 NOTE — PROGRESS NOTE ADULT - ASSESSMENT
1.syncopal episodes  undoubtedly related to   vasovagal syncope characterized by nausea and diaphoresis and brief loss of consciousness  2.  I highly doubt that he had a seizue-neurology consulting  3. Chronic ischemic heart disease-stable, no CHF or angina  4. Renal insufficiency previously normal renal function    Recommendations  No further cardiac workup  Urology follow-up concerning the renal insufficiency  Discharge planning as per urology

## 2018-09-26 NOTE — PROGRESS NOTE ADULT - ASSESSMENT
stable s/p R. lap partial neph; L. corneal abrasion; seizure event yesterday witnessed by hospitalist; transferred to telemetry; no further events

## 2018-09-26 NOTE — EEG REPORT - NS EEG TEXT BOX
[facility]  Report of Routine EEG with Video      [facility]: [address], [address] [place] [address], Phone: [phone]  [facility]: [address] [place] [address], Phone: [phone]  Office: [address] [place] [address], [place] [address], [place] [address], Phone: [phone]    Patient Name: JOSE VIRK  Age: 63 year  : 1955  Patient ID: -, MRN #: -, Location: [address]  Referring Physician: SHARIF PIERCE  EEG #: [id number]    Study Date: 2018		    Technical Information:					  On Instrument: -  Placement and Labeling of Electrodes:  The EEG was performed utilizing 20 channels referential EEG connections (coronal over temporal over parasagittal montage) using all standard 10-20 electrode placements with EKG.  Recording was at a sampling rate of 256 samples per second per channel.  Time synchronized digital video recording was done simultaneously with EEG recording.  A low light infrared camera was used for low light recording.  Clifford and seizure detection algorithms were utilized.    History:  -r/o seizure    Medication	   amLODIPine   Tablet 2.5 milliGRAM(s) Oral daily aspirin enteric coated 81 milliGRAM(s) Oral daily atorvastatin 20 milliGRAM(s) Oral at bedtime dextrose 5% + sodium chloride 0.45%. 1000 milliLiter(s) (75 mL/Hr) IV Continuous <Continuous> docusate sodium 100 milliGRAM(s) Oral three times a day heparin  Injectable 5000 Unit(s) SubCutaneous every 8 hours metoprolol tartrate 50 milliGRAM(s) Oral daily metoprolol tartrate 25 milliGRAM(s) Oral at bedtime pantoprazole    Tablet 40 milliGRAM(s) Oral before breakfast 	    Study Interpretation:    FINDINGS:  The background was continuous, spontaneously variable and reactive. During wakefulness, the posterior dominant rhythm consisted of symmetric, well-modulated 10 Hz activity, with amplitude to 30 uV, that attenuated to eye opening.     Background Slowing:  No generalized background slowing was present.    Focal Slowing:   None was present.    Sleep Background:  Drowsiness was characterized by fragmentation, attenuation, and slowing of the background activity.    Sleep was characterized by the presence of rudimentary spindles, and K-complexes.    Other Non-Epileptiform Findings:  None were present.    Interictal Epileptiform Activity:   None were present.    Events:  Clinical events: None recorded.  Seizures: None recorded.    Activation Procedures:   Hyperventilation was not performed.    Photic stimulation was not performed.    Artifacts:  Intermittent myogenic and movement artifacts were noted.    ECG:  The heart rate on single channel ECG was predominantly between 80-90 BPM.    EEG Summary/Classification:  Normal EEG in the awake / drowsy / asleep state(s).    EEG Impression/Clinical Correlate:  Normal EEG study.  No epileptic pattern or seizure seen.      Meorn Gallardo MD  Epilepsy Fellow, [facility] [place]    	      ________________________  Rodney Gerber MD PhD  Attending Physician, [facility]  Report of Routine EEG with Video      [facility]: [address], [address] [place] [address], Phone: [phone]  [facility]: [address] [place] [address], Phone: [phone]  Office: [address] [place] [address], [place] [address], [place] [address], Phone: [phone]    Patient Name: JOSE VIRK  Age: 63 year  : 1955  Location: [address] [place] [address]  Referring Physician: SHARIF PIERCE  EEG #: [id number]    Study Date: 2018		    Technical Information:					    Placement and Labeling of Electrodes:  The EEG was performed utilizing 20 channels referential EEG connections (coronal over temporal over parasagittal montage) using all standard 10-20 electrode placements with EKG.  Recording was at a sampling rate of 256 samples per second per channel.  Time synchronized digital video recording was done simultaneously with EEG recording.  A low light infrared camera was used for low light recording.  Clifford and seizure detection algorithms were utilized.    History:  -r/o seizure    Medication	   amLODIPine   Tablet 2.5 milliGRAM(s) Oral daily aspirin enteric coated 81 milliGRAM(s) Oral daily atorvastatin 20 milliGRAM(s) Oral at bedtime metoprolol tartrate 50 milliGRAM(s) Oral daily metoprolol tartrate 25 milliGRAM(s) Oral at bedtime pantoprazole    Tablet 40 milliGRAM(s) Oral before breakfast 	    Study Interpretation:    FINDINGS:  The background was continuous, spontaneously variable and reactive. During wakefulness, the posterior dominant rhythm consisted of symmetric, well-modulated 10 Hz activity, with amplitude to 30 uV, that attenuated to eye opening.     Background Slowing:  No generalized background slowing was present.    Focal Slowing:   None was present.    Sleep Background:  Drowsiness was characterized by fragmentation, attenuation, and slowing of the background activity.    Sleep was characterized by the presence of rudimentary spindles, and K-complexes.    Other Non-Epileptiform Findings:  None were present.    Interictal Epileptiform Activity:   None were present.    Events:  Clinical events: None recorded.  Seizures: None recorded.    Activation Procedures:   Hyperventilation was not performed.    Photic stimulation was not performed.    Artifacts:  Intermittent myogenic and movement artifacts were noted.    ECG:  The heart rate on single channel ECG was predominantly between 80-90 BPM.    EEG Summary/Classification:  Normal EEG in the awake / drowsy / asleep state(s).    EEG Impression/Clinical Correlate:  Normal EEG study.  No epileptic pattern or seizure seen.      Meron Gallardo MD  Epilepsy Fellow, [facility] [place]

## 2018-09-26 NOTE — DISCHARGE NOTE ADULT - CARE PROVIDER_API CALL
Breezy Brito), Urology  [address] [place] [address]  Phone: [phone]  Fax: [phone]

## 2018-09-27 PROBLEM — I25.10 ATHEROSCLEROTIC HEART DISEASE OF NATIVE CORONARY ARTERY WITHOUT ANGINA PECTORIS: Chronic | Status: ACTIVE | Noted: 2018-09-13

## 2018-09-27 PROBLEM — E66.9 OBESITY, UNSPECIFIED: Chronic | Status: ACTIVE | Noted: 2018-09-13

## 2018-09-27 PROBLEM — D49.59 NEOPLASM OF UNSPECIFIED BEHAVIOR OF OTHER GENITOURINARY ORGAN: Chronic | Status: ACTIVE | Noted: 2018-09-13

## 2018-09-27 PROBLEM — M10.9 GOUT, UNSPECIFIED: Chronic | Status: ACTIVE | Noted: 2018-09-13

## 2018-09-27 PROBLEM — M19.90 UNSPECIFIED OSTEOARTHRITIS, UNSPECIFIED SITE: Chronic | Status: ACTIVE | Noted: 2018-09-13

## 2018-09-27 PROBLEM — K21.9 GASTRO-ESOPHAGEAL REFLUX DISEASE WITHOUT ESOPHAGITIS: Chronic | Status: ACTIVE | Noted: 2018-09-13

## 2018-09-27 PROBLEM — I10 ESSENTIAL (PRIMARY) HYPERTENSION: Chronic | Status: ACTIVE | Noted: 2018-09-13

## 2018-09-27 LAB — SURGICAL PATHOLOGY STUDY: SIGNIFICANT CHANGE UP

## 2018-10-03 ENCOUNTER — LABORATORY RESULT (OUTPATIENT)
Age: 63
End: 2018-10-03

## 2018-10-03 ENCOUNTER — APPOINTMENT (OUTPATIENT)
Dept: UROLOGY | Facility: CLINIC | Age: 63
End: 2018-10-03
Payer: COMMERCIAL

## 2018-10-03 VITALS
TEMPERATURE: 98.1 F | OXYGEN SATURATION: 95 % | WEIGHT: 255 LBS | SYSTOLIC BLOOD PRESSURE: 160 MMHG | HEART RATE: 81 BPM | BODY MASS INDEX: 34.54 KG/M2 | DIASTOLIC BLOOD PRESSURE: 88 MMHG | HEIGHT: 72 IN

## 2018-10-03 DIAGNOSIS — D49.511 NEOPLASM OF UNSPECIFIED BEHAVIOR OF RIGHT KIDNEY: ICD-10-CM

## 2018-10-03 PROCEDURE — 99024 POSTOP FOLLOW-UP VISIT: CPT

## 2018-10-04 LAB
ANION GAP SERPL CALC-SCNC: 15 MMOL/L
BASOPHILS # BLD AUTO: 0.02 K/UL
BASOPHILS NFR BLD AUTO: 0.2 %
BUN SERPL-MCNC: 34 MG/DL
CALCIUM SERPL-MCNC: 9.4 MG/DL
CHLORIDE SERPL-SCNC: 97 MMOL/L
CO2 SERPL-SCNC: 25 MMOL/L
CREAT SERPL-MCNC: 1.92 MG/DL
EOSINOPHIL # BLD AUTO: 0.25 K/UL
EOSINOPHIL NFR BLD AUTO: 2.6 %
GLUCOSE SERPL-MCNC: 124 MG/DL
HCT VFR BLD CALC: 42.2 %
HGB BLD-MCNC: 13.5 G/DL
IMM GRANULOCYTES NFR BLD AUTO: 1.2 %
LYMPHOCYTES # BLD AUTO: 1.45 K/UL
LYMPHOCYTES NFR BLD AUTO: 15.3 %
MAN DIFF?: NORMAL
MCHC RBC-ENTMCNC: 29.2 PG
MCHC RBC-ENTMCNC: 32 GM/DL
MCV RBC AUTO: 91.3 FL
MONOCYTES # BLD AUTO: 0.93 K/UL
MONOCYTES NFR BLD AUTO: 9.8 %
NEUTROPHILS # BLD AUTO: 6.72 K/UL
NEUTROPHILS NFR BLD AUTO: 70.9 %
PLATELET # BLD AUTO: 296 K/UL
POTASSIUM SERPL-SCNC: 4.9 MMOL/L
RBC # BLD: 4.62 M/UL
RBC # FLD: 13.7 %
SODIUM SERPL-SCNC: 137 MMOL/L
WBC # FLD AUTO: 9.48 K/UL

## 2018-10-10 ENCOUNTER — APPOINTMENT (OUTPATIENT)
Dept: UROLOGY | Facility: CLINIC | Age: 63
End: 2018-10-10

## 2018-10-25 ENCOUNTER — LABORATORY RESULT (OUTPATIENT)
Age: 63
End: 2018-10-25

## 2018-10-25 ENCOUNTER — APPOINTMENT (OUTPATIENT)
Dept: INTERNAL MEDICINE | Facility: CLINIC | Age: 63
End: 2018-10-25
Payer: COMMERCIAL

## 2018-10-25 VITALS — DIASTOLIC BLOOD PRESSURE: 70 MMHG | SYSTOLIC BLOOD PRESSURE: 140 MMHG

## 2018-10-25 VITALS — BODY MASS INDEX: 34.13 KG/M2 | HEIGHT: 72 IN | WEIGHT: 252 LBS

## 2018-10-25 LAB
BILIRUB UR QL STRIP: NORMAL
CLARITY UR: CLEAR
COLLECTION METHOD: NORMAL
GLUCOSE UR-MCNC: NORMAL
HCG UR QL: 0.2 EU/DL
HGB UR QL STRIP.AUTO: NORMAL
KETONES UR-MCNC: NORMAL
LEUKOCYTE ESTERASE UR QL STRIP: NORMAL
NITRITE UR QL STRIP: NORMAL
PH UR STRIP: 5.5
PROT UR STRIP-MCNC: NORMAL
SP GR UR STRIP: 1.01

## 2018-10-25 PROCEDURE — 99214 OFFICE O/P EST MOD 30 MIN: CPT | Mod: 25

## 2018-10-25 PROCEDURE — 36415 COLL VENOUS BLD VENIPUNCTURE: CPT

## 2018-10-25 PROCEDURE — 90686 IIV4 VACC NO PRSV 0.5 ML IM: CPT

## 2018-10-25 PROCEDURE — G0008: CPT

## 2018-10-25 PROCEDURE — 81003 URINALYSIS AUTO W/O SCOPE: CPT | Mod: QW

## 2018-10-25 RX ORDER — HYDROCODONE BITARTRATE AND HOMATROPINE METHYLBROMIDE 5; 1.5 MG/5ML; MG/5ML
5-1.5 SYRUP ORAL
Qty: 100 | Refills: 0 | Status: DISCONTINUED | COMMUNITY
Start: 2018-04-02 | End: 2018-10-25

## 2018-10-25 RX ORDER — METHYLPREDNISOLONE 4 MG/1
4 TABLET ORAL
Qty: 1 | Refills: 1 | Status: DISCONTINUED | COMMUNITY
Start: 2018-08-02 | End: 2018-10-25

## 2018-10-25 RX ORDER — CLOPIDOGREL BISULFATE 75 MG/1
75 TABLET, FILM COATED ORAL
Qty: 90 | Refills: 0 | Status: DISCONTINUED | COMMUNITY
Start: 2017-11-09 | End: 2018-10-25

## 2018-10-25 NOTE — PHYSICAL EXAM
[Normal Sclera/Conjunctiva] : normal sclera/conjunctiva [PERRL] : pupils equal round and reactive to light [EOMI] : extraocular movements intact [No JVD] : no jugular venous distention [Supple] : supple [No Respiratory Distress] : no respiratory distress  [Clear to Auscultation] : lungs were clear to auscultation bilaterally [Normal Rate] : normal rate  [Regular Rhythm] : with a regular rhythm [Normal S1, S2] : normal S1 and S2 [Soft] : abdomen soft [Non Tender] : non-tender [Non-distended] : non-distended [No HSM] : no HSM [Normal Bowel Sounds] : normal bowel sounds [No Hernias] : no hernias

## 2018-10-25 NOTE — REVIEW OF SYSTEMS
[Fatigue] : fatigue [Abdominal Pain] : abdominal pain [Nausea] : nausea [Constipation] : constipation [Hesitancy] : hesitancy [Nocturia] : nocturia [Fainting] : fainting [Negative] : Integumentary

## 2018-10-25 NOTE — HISTORY OF PRESENT ILLNESS
[de-identified] : This is 63-year-old gentleman with a history of ASHD status post MI status post stent who recently had a partial nephrectomy for renal carcinoma. The patient is here for postoperative check.His hospital course was complicated by episodes of visual field syncope which required him to be transferred to the coronary care unit. Cardiac monitoring was normal. In addition he had an EEG which was also normal.

## 2018-10-31 ENCOUNTER — MEDICATION RENEWAL (OUTPATIENT)
Age: 63
End: 2018-10-31

## 2018-11-02 LAB
25(OH)D3 SERPL-MCNC: 27.8 NG/ML
ALBUMIN SERPL ELPH-MCNC: 4.4 G/DL
ALP BLD-CCNC: 157 U/L
ALT SERPL-CCNC: 30 U/L
ANION GAP SERPL CALC-SCNC: 15 MMOL/L
AST SERPL-CCNC: 20 U/L
BASOPHILS # BLD AUTO: 0.02 K/UL
BASOPHILS NFR BLD AUTO: 0.3 %
BILIRUB SERPL-MCNC: 0.3 MG/DL
BUN SERPL-MCNC: 37 MG/DL
CALCIUM SERPL-MCNC: 10.1 MG/DL
CHLORIDE SERPL-SCNC: 103 MMOL/L
CHOLEST SERPL-MCNC: 150 MG/DL
CHOLEST/HDLC SERPL: 3.7 RATIO
CO2 SERPL-SCNC: 22 MMOL/L
CREAT SERPL-MCNC: 2.11 MG/DL
EOSINOPHIL # BLD AUTO: 0.15 K/UL
EOSINOPHIL NFR BLD AUTO: 2.4 %
GLUCOSE SERPL-MCNC: 98 MG/DL
HBA1C MFR BLD HPLC: 6.2 %
HCT VFR BLD CALC: 38.8 %
HDLC SERPL-MCNC: 41 MG/DL
HGB BLD-MCNC: 12.9 G/DL
IMM GRANULOCYTES NFR BLD AUTO: 0.2 %
LDLC SERPL CALC-MCNC: 86 MG/DL
LYMPHOCYTES # BLD AUTO: 1.4 K/UL
LYMPHOCYTES NFR BLD AUTO: 22 %
MAN DIFF?: NORMAL
MCHC RBC-ENTMCNC: 28.5 PG
MCHC RBC-ENTMCNC: 33.2 GM/DL
MCV RBC AUTO: 85.8 FL
MONOCYTES # BLD AUTO: 0.45 K/UL
MONOCYTES NFR BLD AUTO: 7.1 %
NEUTROPHILS # BLD AUTO: 4.34 K/UL
NEUTROPHILS NFR BLD AUTO: 68 %
PLATELET # BLD AUTO: 220 K/UL
POTASSIUM SERPL-SCNC: 4.2 MMOL/L
PROT SERPL-MCNC: 7.6 G/DL
RBC # BLD: 4.52 M/UL
RBC # FLD: 13.2 %
SAVE SPECIMEN: NORMAL
SODIUM SERPL-SCNC: 140 MMOL/L
T3RU NFR SERPL: 1.09 INDEX
T4 SERPL-MCNC: 7.8 UG/DL
TRIGL SERPL-MCNC: 115 MG/DL
TSH SERPL-ACNC: 1.56 UIU/ML
URATE SERPL-MCNC: 5 MG/DL
WBC # FLD AUTO: 6.37 K/UL

## 2018-11-22 ENCOUNTER — RX RENEWAL (OUTPATIENT)
Age: 63
End: 2018-11-22

## 2018-11-26 ENCOUNTER — RX RENEWAL (OUTPATIENT)
Age: 63
End: 2018-11-26

## 2019-02-09 ENCOUNTER — RX RENEWAL (OUTPATIENT)
Age: 64
End: 2019-02-09

## 2019-04-05 NOTE — DISCHARGE NOTE ADULT - LAUNCH MEDICATION RECONCILIATION
<<-----Click here for Discharge Medication Review writer familiar with the patient from previous admissions writer familiar with the patient from previous admissions writer familiar with the patient from previous admissions writer familiar with the patient from previous admissions writer familiar with the patient from previous admissions writer familiar with the patient from previous admissions writer familiar with the patient from previous admissions writer familiar with the patient from previous admissions writer familiar with the patient from previous admissions writer familiar with the patient from previous admissions writer familiar with the patient from previous admissions writer familiar with the patient from previous admissions writer familiar with the patient from previous admissions writer familiar with the patient from previous admissions writer familiar with the patient from previous admissions writer familiar with the patient from previous admissions writer familiar with the patient from previous admissions writer familiar with the patient from previous admissions writer familiar with the patient from previous admissions writer familiar with the patient from previous admissions writer familiar with the patient from previous admissions writer familiar with the patient from previous admissions writer familiar with the patient from previous admissions

## 2019-04-19 ENCOUNTER — RX RENEWAL (OUTPATIENT)
Age: 64
End: 2019-04-19

## 2019-04-30 ENCOUNTER — RX RENEWAL (OUTPATIENT)
Age: 64
End: 2019-04-30

## 2019-05-20 ENCOUNTER — RX RENEWAL (OUTPATIENT)
Age: 64
End: 2019-05-20

## 2019-06-03 ENCOUNTER — RX RENEWAL (OUTPATIENT)
Age: 64
End: 2019-06-03

## 2019-06-17 ENCOUNTER — RX RENEWAL (OUTPATIENT)
Age: 64
End: 2019-06-17

## 2019-07-11 ENCOUNTER — RX RENEWAL (OUTPATIENT)
Age: 64
End: 2019-07-11

## 2019-07-14 ENCOUNTER — RX RENEWAL (OUTPATIENT)
Age: 64
End: 2019-07-14

## 2019-07-14 ENCOUNTER — TRANSCRIPTION ENCOUNTER (OUTPATIENT)
Age: 64
End: 2019-07-14

## 2019-07-14 RX ORDER — FEBUXOSTAT 40 MG/1
40 TABLET ORAL
Qty: 90 | Refills: 1 | Status: DISCONTINUED | COMMUNITY
Start: 2018-08-02 | End: 2019-07-14

## 2019-07-17 ENCOUNTER — RX RENEWAL (OUTPATIENT)
Age: 64
End: 2019-07-17

## 2019-07-31 ENCOUNTER — RX RENEWAL (OUTPATIENT)
Age: 64
End: 2019-07-31

## 2019-08-02 ENCOUNTER — APPOINTMENT (OUTPATIENT)
Dept: INTERNAL MEDICINE | Facility: CLINIC | Age: 64
End: 2019-08-02
Payer: COMMERCIAL

## 2019-08-02 PROCEDURE — 36415 COLL VENOUS BLD VENIPUNCTURE: CPT

## 2019-08-03 LAB
ALBUMIN SERPL ELPH-MCNC: 4.5 G/DL
ALP BLD-CCNC: 112 U/L
ALT SERPL-CCNC: 17 U/L
ANION GAP SERPL CALC-SCNC: 13 MMOL/L
AST SERPL-CCNC: 18 U/L
BILIRUB SERPL-MCNC: 0.6 MG/DL
BUN SERPL-MCNC: 22 MG/DL
CALCIUM SERPL-MCNC: 9.7 MG/DL
CHLORIDE SERPL-SCNC: 103 MMOL/L
CHOLEST SERPL-MCNC: 141 MG/DL
CHOLEST/HDLC SERPL: 3.2 RATIO
CO2 SERPL-SCNC: 25 MMOL/L
CREAT SERPL-MCNC: 1.57 MG/DL
GLUCOSE SERPL-MCNC: 107 MG/DL
HDLC SERPL-MCNC: 44 MG/DL
LDLC SERPL CALC-MCNC: 79 MG/DL
MEV IGG FLD QL IA: >300 AU/ML
MEV IGG+IGM SER-IMP: POSITIVE
MUV AB SER-ACNC: POSITIVE
MUV IGG SER QL IA: >300 AU/ML
POTASSIUM SERPL-SCNC: 4.3 MMOL/L
PROT SERPL-MCNC: 6.7 G/DL
RUBV IGG FLD-ACNC: 29 INDEX
RUBV IGG SER-IMP: POSITIVE
SAVE SPECIMEN: NORMAL
SODIUM SERPL-SCNC: 141 MMOL/L
TRIGL SERPL-MCNC: 92 MG/DL
URATE SERPL-MCNC: 6.7 MG/DL
VZV AB TITR SER: POSITIVE
VZV IGG SER IF-ACNC: 2337 INDEX

## 2019-08-27 ENCOUNTER — LABORATORY RESULT (OUTPATIENT)
Age: 64
End: 2019-08-27

## 2019-08-27 ENCOUNTER — NON-APPOINTMENT (OUTPATIENT)
Age: 64
End: 2019-08-27

## 2019-08-27 ENCOUNTER — APPOINTMENT (OUTPATIENT)
Dept: INTERNAL MEDICINE | Facility: CLINIC | Age: 64
End: 2019-08-27
Payer: COMMERCIAL

## 2019-08-27 VITALS — HEIGHT: 71 IN | BODY MASS INDEX: 35 KG/M2 | WEIGHT: 250 LBS

## 2019-08-27 DIAGNOSIS — Z87.898 PERSONAL HISTORY OF OTHER SPECIFIED CONDITIONS: ICD-10-CM

## 2019-08-27 DIAGNOSIS — R39.9 UNSPECIFIED SYMPTOMS AND SIGNS INVOLVING THE GENITOURINARY SYSTEM: ICD-10-CM

## 2019-08-27 DIAGNOSIS — J45.991 COUGH VARIANT ASTHMA: ICD-10-CM

## 2019-08-27 DIAGNOSIS — Z87.09 PERSONAL HISTORY OF OTHER DISEASES OF THE RESPIRATORY SYSTEM: ICD-10-CM

## 2019-08-27 LAB
BILIRUB UR QL STRIP: NEGATIVE
CLARITY UR: CLEAR
COLLECTION METHOD: NORMAL
GLUCOSE UR-MCNC: NEGATIVE
HCG UR QL: 0.2 EU/DL
HGB UR QL STRIP.AUTO: NEGATIVE
KETONES UR-MCNC: NEGATIVE
LEUKOCYTE ESTERASE UR QL STRIP: NEGATIVE
NITRITE UR QL STRIP: NEGATIVE
PH UR STRIP: 5.5
PROT UR STRIP-MCNC: NEGATIVE
SP GR UR STRIP: 1.02

## 2019-08-27 PROCEDURE — 36415 COLL VENOUS BLD VENIPUNCTURE: CPT

## 2019-08-27 PROCEDURE — 81003 URINALYSIS AUTO W/O SCOPE: CPT | Mod: QW

## 2019-08-27 PROCEDURE — 90732 PPSV23 VACC 2 YRS+ SUBQ/IM: CPT

## 2019-08-27 PROCEDURE — 99397 PER PM REEVAL EST PAT 65+ YR: CPT | Mod: 25

## 2019-08-27 PROCEDURE — 93000 ELECTROCARDIOGRAM COMPLETE: CPT

## 2019-08-27 PROCEDURE — G0009: CPT

## 2019-08-27 NOTE — HEALTH RISK ASSESSMENT
[] : No [2 - 3 times a week (3 pts)] : 2 - 3  times a week (3 points) [Yes] : Yes [0] : 2) Feeling down, depressed, or hopeless: Not at all (0) [Audit-CScore] : 3 [[id number]] : 0 [Fully functional (bathing, dressing, toileting, transferring, walking, feeding)] : Fully functional (bathing, dressing, toileting, transferring, walking, feeding) [Fully functional (using the telephone, shopping, preparing meals, housekeeping, doing laundry, using] : Fully functional and needs no help or supervision to perform IADLs (using the telephone, shopping, preparing meals, housekeeping, doing laundry, using transportation, managing medications and managing finances) [AdvancecareDate] : 8/27/19

## 2019-08-27 NOTE — ASSESSMENT
[FreeTextEntry1] : This is a patient with history of hypertension, hypercholesterolemia, hypernephroma, myocardial infarction. His vital signs are stable his physical examination is normal except for his weight. The patient was referred for CT of the abdomen and pelvis to followup on his carcinoma and was advised to proceed with an echo and stress test

## 2019-08-27 NOTE — REVIEW OF SYSTEMS
[Fatigue] : fatigue [Abdominal Pain] : no abdominal pain [Hesitancy] : hesitancy [Constipation] : no constipation [Nausea] : no nausea [Nocturia] : nocturia [Fainting] : fainting [Negative] : Heme/Lymph

## 2019-08-27 NOTE — HISTORY OF PRESENT ILLNESS
[de-identified] : This is a 64-year-old gentleman who is here for his annual examination. The patient has a history of hypertension, status post cardiac stent placement, status post myocardial infarction also status post right partial nephrectomy for carcinoma who is here today for his annual check. He also has a history of gout

## 2019-08-27 NOTE — PHYSICAL EXAM
[No Mass] : no mass [Normal Sphincter Tone] : normal sphincter tone [Stool Occult Blood] : stool negative for occult blood [Scrotum] : the scrotum was normal [Testes Tenderness] : no tenderness of the testes [Testes Mass (___cm)] : there were no testicular masses [Prostate Tenderness] : the prostate was not tender [No Prostate Nodules] : no prostate nodules [Normal] : affect was normal and insight and judgment were intact

## 2019-08-28 ENCOUNTER — APPOINTMENT (OUTPATIENT)
Dept: INTERNAL MEDICINE | Facility: CLINIC | Age: 64
End: 2019-08-28
Payer: COMMERCIAL

## 2019-08-28 ENCOUNTER — CLINICAL ADVICE (OUTPATIENT)
Age: 64
End: 2019-08-28

## 2019-08-28 LAB
25(OH)D3 SERPL-MCNC: 30.2 NG/ML
ALBUMIN SERPL ELPH-MCNC: 4.8 G/DL
ALP BLD-CCNC: 108 U/L
ALT SERPL-CCNC: 20 U/L
ANION GAP SERPL CALC-SCNC: 13 MMOL/L
AST SERPL-CCNC: 15 U/L
BASOPHILS # BLD AUTO: 0.08 K/UL
BASOPHILS NFR BLD AUTO: 1.2 %
BILIRUB SERPL-MCNC: 0.5 MG/DL
BUN SERPL-MCNC: 30 MG/DL
CALCIUM SERPL-MCNC: 9.9 MG/DL
CHLORIDE SERPL-SCNC: 102 MMOL/L
CHOLEST SERPL-MCNC: 143 MG/DL
CHOLEST/HDLC SERPL: 3 RATIO
CO2 SERPL-SCNC: 25 MMOL/L
CREAT SERPL-MCNC: 1.49 MG/DL
EOSINOPHIL # BLD AUTO: 0.09 K/UL
EOSINOPHIL NFR BLD AUTO: 1.4 %
ESTIMATED AVERAGE GLUCOSE: 123 MG/DL
FERRITIN SERPL-MCNC: 102 NG/ML
GLUCOSE SERPL-MCNC: 103 MG/DL
HBA1C MFR BLD HPLC: 5.9 %
HCT VFR BLD CALC: 47.2 %
HDLC SERPL-MCNC: 48 MG/DL
HGB BLD-MCNC: 15.2 G/DL
IMM GRANULOCYTES NFR BLD AUTO: 0.3 %
LDLC SERPL CALC-MCNC: 75 MG/DL
LYMPHOCYTES # BLD AUTO: 1.19 K/UL
LYMPHOCYTES NFR BLD AUTO: 17.9 %
MAN DIFF?: NORMAL
MCHC RBC-ENTMCNC: 28.7 PG
MCHC RBC-ENTMCNC: 32.2 GM/DL
MCV RBC AUTO: 89.1 FL
MONOCYTES # BLD AUTO: 0.45 K/UL
MONOCYTES NFR BLD AUTO: 6.8 %
NEUTROPHILS # BLD AUTO: 4.83 K/UL
NEUTROPHILS NFR BLD AUTO: 72.4 %
PLATELET # BLD AUTO: 193 K/UL
POTASSIUM SERPL-SCNC: 4.7 MMOL/L
PROT SERPL-MCNC: 6.9 G/DL
PSA SERPL-MCNC: 3.18 NG/ML
RBC # BLD: 5.3 M/UL
RBC # FLD: 13.3 %
SAVE SPECIMEN: NORMAL
SODIUM SERPL-SCNC: 140 MMOL/L
T3RU NFR SERPL: 1.1 TBI
T4 SERPL-MCNC: 7.3 UG/DL
TRIGL SERPL-MCNC: 101 MG/DL
TSH SERPL-ACNC: 1.43 UIU/ML
URATE SERPL-MCNC: 6.6 MG/DL
VIT B12 SERPL-MCNC: 452 PG/ML
WBC # FLD AUTO: 6.66 K/UL

## 2019-08-28 PROCEDURE — 36415 COLL VENOUS BLD VENIPUNCTURE: CPT

## 2019-08-29 DIAGNOSIS — D49.519 NEOPLASM OF UNSPECIFIED BEHAVIOR OF UNSPECIFIED KIDNEY: ICD-10-CM

## 2019-08-29 LAB
CREAT SERPL-MCNC: 1.54 MG/DL
SAVE SPECIMEN: NORMAL

## 2019-09-07 NOTE — ASU PATIENT PROFILE, ADULT - TEACHING/LEARNING RELIGIOUS CONSIDERATIONS
none
no loss of consciousness, no gait abnormality, no headache, no sensory deficits, and no weakness.

## 2019-09-12 ENCOUNTER — FORM ENCOUNTER (OUTPATIENT)
Age: 64
End: 2019-09-12

## 2019-09-13 ENCOUNTER — OUTPATIENT (OUTPATIENT)
Dept: OUTPATIENT SERVICES | Facility: HOSPITAL | Age: 64
LOS: 1 days | End: 2019-09-13
Payer: COMMERCIAL

## 2019-09-13 ENCOUNTER — APPOINTMENT (OUTPATIENT)
Dept: CT IMAGING | Facility: CLINIC | Age: 64
End: 2019-09-13
Payer: COMMERCIAL

## 2019-09-13 DIAGNOSIS — Z98.890 OTHER SPECIFIED POSTPROCEDURAL STATES: Chronic | ICD-10-CM

## 2019-09-13 DIAGNOSIS — D49.519 NEOPLASM OF UNSPECIFIED BEHAVIOR OF UNSPECIFIED KIDNEY: ICD-10-CM

## 2019-09-13 DIAGNOSIS — Z98.61 CORONARY ANGIOPLASTY STATUS: Chronic | ICD-10-CM

## 2019-09-13 DIAGNOSIS — C64.1 MALIGNANT NEOPLASM OF RIGHT KIDNEY, EXCEPT RENAL PELVIS: ICD-10-CM

## 2019-09-13 PROCEDURE — 74176 CT ABD & PELVIS W/O CONTRAST: CPT

## 2019-09-13 PROCEDURE — 74176 CT ABD & PELVIS W/O CONTRAST: CPT | Mod: 26

## 2019-09-26 ENCOUNTER — RX RENEWAL (OUTPATIENT)
Age: 64
End: 2019-09-26

## 2019-09-26 ENCOUNTER — MEDICATION RENEWAL (OUTPATIENT)
Age: 64
End: 2019-09-26

## 2019-09-26 DIAGNOSIS — G47.00 INSOMNIA, UNSPECIFIED: ICD-10-CM

## 2019-10-02 ENCOUNTER — RX RENEWAL (OUTPATIENT)
Age: 64
End: 2019-10-02

## 2019-11-04 ENCOUNTER — APPOINTMENT (OUTPATIENT)
Dept: INTERNAL MEDICINE | Facility: CLINIC | Age: 64
End: 2019-11-04
Payer: COMMERCIAL

## 2019-11-04 VITALS
BODY MASS INDEX: 34.3 KG/M2 | TEMPERATURE: 98.8 F | SYSTOLIC BLOOD PRESSURE: 154 MMHG | HEART RATE: 73 BPM | DIASTOLIC BLOOD PRESSURE: 81 MMHG | HEIGHT: 71 IN | WEIGHT: 245 LBS

## 2019-11-04 DIAGNOSIS — B97.89 ACUTE UPPER RESPIRATORY INFECTION, UNSPECIFIED: ICD-10-CM

## 2019-11-04 DIAGNOSIS — J06.9 ACUTE UPPER RESPIRATORY INFECTION, UNSPECIFIED: ICD-10-CM

## 2019-11-04 PROCEDURE — 99213 OFFICE O/P EST LOW 20 MIN: CPT

## 2019-11-04 RX ORDER — ZOSTER VACCINE RECOMBINANT, ADJUVANTED 50 MCG/0.5
50 KIT INTRAMUSCULAR
Qty: 2 | Refills: 0 | Status: DISCONTINUED | COMMUNITY
Start: 2018-04-05 | End: 2019-11-04

## 2019-11-04 RX ORDER — AMLODIPINE BESYLATE 5 MG/1
5 TABLET ORAL
Qty: 90 | Refills: 0 | Status: ACTIVE | COMMUNITY
Start: 2019-05-21

## 2019-11-04 RX ORDER — FLUTICASONE PROPIONATE 50 UG/1
50 SPRAY, METERED NASAL TWICE DAILY
Qty: 1 | Refills: 1 | Status: ACTIVE | COMMUNITY
Start: 2019-11-04 | End: 1900-01-01

## 2019-11-04 NOTE — HISTORY OF PRESENT ILLNESS
[FreeTextEntry8] : 64 year old male here today with c/o Cough and Cold. \par Denies fevers. \par He is taking Nyquil. \par He usually gets this once a year. \par he has a lot of phlegm. \par it started a few days ago. \par \par

## 2019-11-04 NOTE — REVIEW OF SYSTEMS
[Fatigue] : fatigue [Nasal Discharge] : nasal discharge [Negative] : Cardiovascular [Cough] : no cough

## 2019-11-13 ENCOUNTER — RX RENEWAL (OUTPATIENT)
Age: 64
End: 2019-11-13

## 2019-11-19 NOTE — H&P PST ADULT - CORNEAL ABRASION RISK
Office Note      11/19/2019    Chief Complaint:  Chief Complaint   Patient presents with   • Follow-up       HPI:    Lona presents today with chronic conditions    Getting over a 'cold' = cough, congestion with greenish phlegm x 2 weeks  Treated self with home remedies which helped and improved    Doing well  Knee and back no pain    Sugar controlled    HTN taking medication    Review of Systems   Constitutional: Negative for activity change, appetite change, fatigue and fever.   Respiratory: Negative for cough, chest tightness, wheezing and stridor.    Cardiovascular: HPI  Endocrine: HPI  Musculoskeletal: HPI  Psychiatric/Behavioral: Negative for sleep disturbance. Happy with life   Not stressed      Current Medications:   Current Outpatient Medications   Medication Sig Dispense Refill   • lisinopril (PRINIVIL,ZESTRIL) 30 MG tablet Take 1 tablet by mouth daily. TAKE 1 TABLET ONCE DAILY 90 tablet 3   • Blood Glucose Monitoring Suppl (ACCU-CHEK ZACHARY PLUS) w/Device Kit Apply 2 strips topically 2 times daily. USE AS DIRECTED 180 kit 3   • aspirin 81 MG tablet Take 1 tablet by mouth daily. 90 tablet 3   • metFORMIN (GLUCOPHAGE) 500 MG tablet TAKE 2 TABLETS BY MOUTH TWICE DAILY 360 tablet 2   • mometasone (ELOCON) 0.1 % cream Apply daily if needed for eczema or irritation 45 g 2   • SOFTCLIX LANCETS Misc TEST ONCE A DAY OR AS DIRECTED       No current facility-administered medications for this visit.        Relevant Past Medical History:  Past Medical History:   Diagnosis Date   • Allergy    • Diabetes mellitus (CMS/HCC)    • Essential (primary) hypertension        Social History     Socioeconomic History   • Marital status:      Spouse name: Not on file   • Number of children: Not on file   • Years of education: Not on file   • Highest education level: Not on file   Occupational History   • Not on file   Social Needs   • Financial resource strain: Not on file   • Food insecurity:     Worry: Not on file      Inability: Not on file   • Transportation needs:     Medical: Not on file     Non-medical: Not on file   Tobacco Use   • Smoking status: Never Smoker   • Smokeless tobacco: Never Used   Substance and Sexual Activity   • Alcohol use: No     Frequency: Never   • Drug use: No   • Sexual activity: Never   Lifestyle   • Physical activity:     Days per week: 4 days     Minutes per session: 60 min   • Stress: Not at all   Relationships   • Social connections:     Talks on phone: Not on file     Gets together: Not on file     Attends [employer] service: Not on file     Active member of club or organization: Not on file     Attends meetings of clubs or organizations: Not on file     Relationship status: Not on file   • Intimate partner violence:     Fear of current or ex partner: Not on file     Emotionally abused: Not on file     Physically abused: Not on file     Forced sexual activity: Not on file   Other Topics Concern   • Not on file   Social History Narrative   • Not on file       Past Surgical History:   Procedure Laterality Date   •  section, classic     • Hysterectomy         ALLERGIES:   Allergen Reactions   • Shrimp   (Food) Other (See Comments)     unknown        Family History   Problem Relation Age of Onset   • Diabetes Mother    • Diabetes Father    • Heart disease Father        Examination:   Visit Vitals  /75 (BP Location: LUE - Left upper extremity, Patient Position: Sitting)   Pulse 79   Temp 98.1 °F (36.7 °C) (Oral)   Resp 20   Ht 5' 7\" (1.702 m)   Wt 88.6 kg (195 lb 7 oz)   SpO2 100%   BMI 30.61 kg/m²       Weight    19 1529   Weight: 88.6 kg (195 lb 7 oz)       Physical Exam   Constitutional: Oriented to person, place, and time. Appears well-developed and well-nourished. No distress.   Head: Normocephalic and atraumatic.   Right Ear: External ear normal.   Left Ear: External ear normal.   Nose: Nose normal.   Mouth/Throat: Oropharynx is clear and moist.   Cardiovascular: rapid rate,  regular rhythm, normal heart sounds   Pulmonary/Chest: Effort normal and breath sounds normal. No respiratory distress.No wheezes.   Psychiatric: Behavior is normal. Judgment normal.   Nursing note and vitals reviewed.      Assessment/Plan:  Problem List Items Addressed This Visit        Circulatory    Essential hypertension - Primary     Hypertension is controlled.  Continue current treatment regimen.  Dietary sodium restriction.  Weight loss.  Regular aerobic exercise.  Continue current medications.  Blood pressure will be reassessed in 3 months.         Rapid palpitations     EKG rapid SVT at rate of 170 bpm    Patient to be transferred by ambulance to [facility]   Informed Dr Willoughby (ER MD)          Relevant Orders    ELECTROCARDIOGRAM 12-LEAD       Digestive    Obesity (BMI 30-39.9)     Encourage weight reduction            Musculoskeletal    Back pain, chronic     sxically improved   Encourage continue with dos and dont for back          Chronic pain of right knee     Remains asymptomatic            Endocrine    Type 2 diabetes mellitus (CMS/[facility])     Diabetes is     Today’s Results:  Hemoglobin A1C (%)   Date Value   11/18/2019 6.6 (H)   .  ADA Goal < 7.5% with no severe hypoglycemia.  Hemoglobin A1C (%)   Date Value   11/18/2019 6.6 (H)   .  ADA Goal < 7.0% without severe hypoglycemia.           Relevant Orders    BASIC METABOLIC PANEL    GLYCOHEMOGLOBIN    LIPID PANEL WITH REFLEX    MICROALBUMIN URINE RANDOM                  Mandakini K Pokharna, MD  11/19/2019                 denies

## 2019-12-10 ENCOUNTER — RX RENEWAL (OUTPATIENT)
Age: 64
End: 2019-12-10

## 2020-01-03 NOTE — PATIENT PROFILE ADULT. - PRO ANTICIPATED DISCH DISP
Pt back safely from ED CT scan, awaiting results. Pt aware of plan of care. No acute pain or distress at this time.
home

## 2020-01-17 ENCOUNTER — RX RENEWAL (OUTPATIENT)
Age: 65
End: 2020-01-17

## 2020-02-21 NOTE — CONSULT NOTE ADULT - PROBLEM SELECTOR PROBLEM 3
April 3, 2020       Ms. Laury Krishnan  [address] [facility] [id number]        Dear Ms. Krishnan,     Our records indicate you are due for your follow-up procedure. You should have received a reminder letter notifying you of this within the past few months.    We have not heard from you and would like to remind you of the need for the procedure. Our concern is for your health, and this exam is recommended to monitor for conditions that could become cancerous.    If you will be continuing your care with us, please call our office at [phone]. Please inform the  that you need to schedule your recall.    This letter will serve as our final reminder to you. If you have chosen to complete your procedure with another physician, please give our office a call so that your record can be revised. Once your record is updated, you will not receive further notices from this office.    If you have any questions or concerns, we would be happy to discuss them with you.      Sincerely,        Dr. 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

## 2020-02-25 ENCOUNTER — APPOINTMENT (OUTPATIENT)
Dept: INTERNAL MEDICINE | Facility: CLINIC | Age: 65
End: 2020-02-25
Payer: COMMERCIAL

## 2020-02-25 LAB
ALBUMIN SERPL ELPH-MCNC: 4.5 G/DL
ALP BLD-CCNC: 111 U/L
ALT SERPL-CCNC: 25 U/L
ANION GAP SERPL CALC-SCNC: 11 MMOL/L
AST SERPL-CCNC: 17 U/L
BASOPHILS # BLD AUTO: 0.06 K/UL
BASOPHILS NFR BLD AUTO: 1 %
BILIRUB SERPL-MCNC: 0.5 MG/DL
BUN SERPL-MCNC: 23 MG/DL
CALCIUM SERPL-MCNC: 9.7 MG/DL
CHLORIDE SERPL-SCNC: 102 MMOL/L
CO2 SERPL-SCNC: 26 MMOL/L
CREAT SERPL-MCNC: 1.53 MG/DL
EOSINOPHIL # BLD AUTO: 0.1 K/UL
EOSINOPHIL NFR BLD AUTO: 1.6 %
GLUCOSE SERPL-MCNC: 124 MG/DL
HCT VFR BLD CALC: 49.7 %
HGB BLD-MCNC: 15.9 G/DL
IMM GRANULOCYTES NFR BLD AUTO: 0.3 %
LYMPHOCYTES # BLD AUTO: 1.35 K/UL
LYMPHOCYTES NFR BLD AUTO: 21.7 %
MAN DIFF?: NORMAL
MCHC RBC-ENTMCNC: 28.5 PG
MCHC RBC-ENTMCNC: 32 GM/DL
MCV RBC AUTO: 89.2 FL
MONOCYTES # BLD AUTO: 0.44 K/UL
MONOCYTES NFR BLD AUTO: 7.1 %
NEUTROPHILS # BLD AUTO: 4.25 K/UL
NEUTROPHILS NFR BLD AUTO: 68.3 %
PLATELET # BLD AUTO: 187 K/UL
POTASSIUM SERPL-SCNC: 5 MMOL/L
PROT SERPL-MCNC: 6.6 G/DL
RBC # BLD: 5.57 M/UL
RBC # FLD: 13.2 %
SAVE SPECIMEN: NORMAL
SODIUM SERPL-SCNC: 139 MMOL/L
URATE SERPL-MCNC: 6.3 MG/DL
WBC # FLD AUTO: 6.22 K/UL

## 2020-02-25 PROCEDURE — 36415 COLL VENOUS BLD VENIPUNCTURE: CPT

## 2020-02-27 RX ORDER — TADALAFIL 20 MG/1
20 TABLET ORAL
Qty: 12 | Refills: 3 | Status: DISCONTINUED | COMMUNITY
Start: 2019-04-30 | End: 2020-02-27

## 2020-03-28 ENCOUNTER — RX RENEWAL (OUTPATIENT)
Age: 65
End: 2020-03-28

## 2020-04-06 ENCOUNTER — RX RENEWAL (OUTPATIENT)
Age: 65
End: 2020-04-06

## 2020-05-28 DIAGNOSIS — Z20.828 CONTACT WITH AND (SUSPECTED) EXPOSURE TO OTHER VIRAL COMMUNICABLE DISEASES: ICD-10-CM

## 2020-06-05 ENCOUNTER — RX RENEWAL (OUTPATIENT)
Age: 65
End: 2020-06-05

## 2020-07-22 ENCOUNTER — RX RENEWAL (OUTPATIENT)
Age: 65
End: 2020-07-22

## 2020-08-03 ENCOUNTER — RX RENEWAL (OUTPATIENT)
Age: 65
End: 2020-08-03

## 2020-10-05 ENCOUNTER — RX RENEWAL (OUTPATIENT)
Age: 65
End: 2020-10-05

## 2020-10-14 ENCOUNTER — RX RENEWAL (OUTPATIENT)
Age: 65
End: 2020-10-14

## 2020-12-21 PROBLEM — J06.9 VIRAL URI WITH COUGH: Status: RESOLVED | Noted: 2019-11-04 | Resolved: 2020-12-21

## 2021-01-04 ENCOUNTER — RX RENEWAL (OUTPATIENT)
Age: 66
End: 2021-01-04

## 2021-02-10 ENCOUNTER — RX RENEWAL (OUTPATIENT)
Age: 66
End: 2021-02-10

## 2021-02-12 ENCOUNTER — RX RENEWAL (OUTPATIENT)
Age: 66
End: 2021-02-12

## 2021-05-05 ENCOUNTER — RX CHANGE (OUTPATIENT)
Age: 66
End: 2021-05-05

## 2021-05-06 ENCOUNTER — RX CHANGE (OUTPATIENT)
Age: 66
End: 2021-05-06

## 2021-07-29 ENCOUNTER — RX RENEWAL (OUTPATIENT)
Age: 66
End: 2021-07-29

## 2021-08-02 ENCOUNTER — APPOINTMENT (OUTPATIENT)
Dept: INTERNAL MEDICINE | Facility: CLINIC | Age: 66
End: 2021-08-02

## 2021-08-04 ENCOUNTER — RX RENEWAL (OUTPATIENT)
Age: 66
End: 2021-08-04

## 2021-10-07 ENCOUNTER — LABORATORY RESULT (OUTPATIENT)
Age: 66
End: 2021-10-07

## 2021-10-07 LAB
BASOPHILS # BLD AUTO: 0.04 K/UL
BASOPHILS NFR BLD AUTO: 0.6 %
EOSINOPHIL # BLD AUTO: 0.12 K/UL
EOSINOPHIL NFR BLD AUTO: 1.8 %
HCT VFR BLD CALC: 50.5 %
HGB BLD-MCNC: 16.6 G/DL
IMM GRANULOCYTES NFR BLD AUTO: 0.7 %
LYMPHOCYTES # BLD AUTO: 1.28 K/UL
LYMPHOCYTES NFR BLD AUTO: 19 %
MAN DIFF?: NORMAL
MCHC RBC-ENTMCNC: 29.8 PG
MCHC RBC-ENTMCNC: 32.9 GM/DL
MCV RBC AUTO: 90.7 FL
MONOCYTES # BLD AUTO: 0.49 K/UL
MONOCYTES NFR BLD AUTO: 7.3 %
NEUTROPHILS # BLD AUTO: 4.77 K/UL
NEUTROPHILS NFR BLD AUTO: 70.6 %
PLATELET # BLD AUTO: 176 K/UL
RBC # BLD: 5.57 M/UL
RBC # FLD: 13.4 %
WBC # FLD AUTO: 6.75 K/UL

## 2021-10-08 LAB
25(OH)D3 SERPL-MCNC: 35.4 NG/ML
ALBUMIN SERPL ELPH-MCNC: 4.6 G/DL
ALP BLD-CCNC: 116 U/L
ALT SERPL-CCNC: 21 U/L
ANION GAP SERPL CALC-SCNC: 11 MMOL/L
APPEARANCE: CLEAR
AST SERPL-CCNC: 17 U/L
BACTERIA: NEGATIVE
BILIRUB SERPL-MCNC: 0.5 MG/DL
BILIRUBIN URINE: NEGATIVE
BLOOD URINE: NEGATIVE
BUN SERPL-MCNC: 29 MG/DL
CALCIUM SERPL-MCNC: 9.8 MG/DL
CHLORIDE SERPL-SCNC: 102 MMOL/L
CHOLEST SERPL-MCNC: 165 MG/DL
CO2 SERPL-SCNC: 26 MMOL/L
COLOR: NORMAL
CREAT SERPL-MCNC: 1.56 MG/DL
ESTIMATED AVERAGE GLUCOSE: 128 MG/DL
GLUCOSE QUALITATIVE U: NEGATIVE
GLUCOSE SERPL-MCNC: 124 MG/DL
HBA1C MFR BLD HPLC: 6.1 %
HDLC SERPL-MCNC: 45 MG/DL
HYALINE CASTS: 1 /LPF
KETONES URINE: NEGATIVE
LDLC SERPL CALC-MCNC: 93 MG/DL
LEUKOCYTE ESTERASE URINE: NEGATIVE
MICROSCOPIC-UA: NORMAL
NITRITE URINE: NEGATIVE
NONHDLC SERPL-MCNC: 120 MG/DL
PH URINE: 6
POTASSIUM SERPL-SCNC: 4.7 MMOL/L
PROT SERPL-MCNC: 6.6 G/DL
PROTEIN URINE: ABNORMAL
RED BLOOD CELLS URINE: 1 /HPF
SAVE SPECIMEN: NORMAL
SODIUM SERPL-SCNC: 139 MMOL/L
SPECIFIC GRAVITY URINE: 1.02
SQUAMOUS EPITHELIAL CELLS: 0 /HPF
T3RU NFR SERPL: 1.1 TBI
T4 SERPL-MCNC: 7.8 UG/DL
TRIGL SERPL-MCNC: 134 MG/DL
TSH SERPL-ACNC: 1.85 UIU/ML
URATE SERPL-MCNC: 5.9 MG/DL
UROBILINOGEN URINE: NORMAL
WHITE BLOOD CELLS URINE: 1 /HPF

## 2021-10-09 LAB
PSA FREE FLD-MCNC: 43 %
PSA FREE SERPL-MCNC: 0.88 NG/ML
PSA SERPL-MCNC: 2.05 NG/ML

## 2021-10-11 ENCOUNTER — NON-APPOINTMENT (OUTPATIENT)
Age: 66
End: 2021-10-11

## 2021-10-11 ENCOUNTER — APPOINTMENT (OUTPATIENT)
Dept: INTERNAL MEDICINE | Facility: CLINIC | Age: 66
End: 2021-10-11
Payer: COMMERCIAL

## 2021-10-11 VITALS — WEIGHT: 245 LBS | HEIGHT: 71 IN | HEART RATE: 76 BPM | TEMPERATURE: 97.9 F | BODY MASS INDEX: 34.3 KG/M2

## 2021-10-11 DIAGNOSIS — Z23 ENCOUNTER FOR IMMUNIZATION: ICD-10-CM

## 2021-10-11 PROCEDURE — 93000 ELECTROCARDIOGRAM COMPLETE: CPT

## 2021-10-11 PROCEDURE — 90662 IIV NO PRSV INCREASED AG IM: CPT

## 2021-10-11 PROCEDURE — G0008: CPT

## 2021-10-11 PROCEDURE — 99397 PER PM REEVAL EST PAT 65+ YR: CPT | Mod: 25

## 2021-10-11 NOTE — PHYSICAL EXAM
[Normal Sphincter Tone] : normal sphincter tone [No Mass] : no mass [Stool Occult Blood] : stool negative for occult blood [Scrotum] : the scrotum was normal [Testes Tenderness] : no tenderness of the testes [Testes Mass (___cm)] : there were no testicular masses [Prostate Tenderness] : the prostate was not tender [No Prostate Nodules] : no prostate nodules [Normal] : affect was normal and insight and judgment were intact

## 2021-10-11 NOTE — REVIEW OF SYSTEMS
[Fatigue] : fatigue [Abdominal Pain] : no abdominal pain [Nausea] : no nausea [Constipation] : no constipation [Hesitancy] : hesitancy [Nocturia] : nocturia [Fainting] : fainting [Negative] : Heme/Lymph

## 2021-10-11 NOTE — HEALTH RISK ASSESSMENT
[] : No [Yes] : Yes [2 - 3 times a week (3 pts)] : 2 - 3  times a week (3 points) [0] : 2) Feeling down, depressed, or hopeless: Not at all (0) [Audit-CScore] : 3 [[id number]] : 0 [Fully functional (bathing, dressing, toileting, transferring, walking, feeding)] : Fully functional (bathing, dressing, toileting, transferring, walking, feeding) [Fully functional (using the telephone, shopping, preparing meals, housekeeping, doing laundry, using] : Fully functional and needs no help or supervision to perform IADLs (using the telephone, shopping, preparing meals, housekeeping, doing laundry, using transportation, managing medications and managing finances)

## 2021-10-11 NOTE — ASSESSMENT
[FreeTextEntry1] : This is a patient with history of hypertension, hypercholesterolemia, hypernephroma, myocardial infarction. His vital signs are stable his physical examination is normal except for his weight. The patient was referred for CT of the abdomen and pelvis to followup on his carcinoma and was advised to proceed with an echo and stress test\par And a colonoscopy

## 2021-10-11 NOTE — HISTORY OF PRESENT ILLNESS
[de-identified] : This is a 66 -year-old gentleman who is here for his annual examination. The patient has a history of hypertension, status post cardiac stent placement, status post myocardial infarction also status post right partial nephrectomy for carcinoma who is here today for his annual check. He also has a history of gout

## 2021-10-15 ENCOUNTER — RX RENEWAL (OUTPATIENT)
Age: 66
End: 2021-10-15

## 2021-10-26 ENCOUNTER — APPOINTMENT (OUTPATIENT)
Dept: CARDIOLOGY | Facility: CLINIC | Age: 66
End: 2021-10-26
Payer: COMMERCIAL

## 2021-10-26 VITALS
HEIGHT: 71 IN | BODY MASS INDEX: 34.3 KG/M2 | HEART RATE: 83 BPM | SYSTOLIC BLOOD PRESSURE: 159 MMHG | DIASTOLIC BLOOD PRESSURE: 95 MMHG | OXYGEN SATURATION: 95 % | WEIGHT: 245 LBS

## 2021-10-26 DIAGNOSIS — Z78.9 OTHER SPECIFIED HEALTH STATUS: ICD-10-CM

## 2021-10-26 PROCEDURE — 93000 ELECTROCARDIOGRAM COMPLETE: CPT

## 2021-10-26 PROCEDURE — 99204 OFFICE O/P NEW MOD 45 MIN: CPT

## 2021-10-26 RX ORDER — RANITIDINE 300 MG/1
300 TABLET ORAL
Qty: 30 | Refills: 2 | Status: DISCONTINUED | COMMUNITY
Start: 2018-08-02 | End: 2021-10-26

## 2021-10-29 NOTE — DISCUSSION/SUMMARY
[FreeTextEntry1] : 1.  Increase Norvasc to 5 mg a day which can be increased to 10 mg a day for better blood pressure control\par \par 2.  Weight reduction and exercise.  Weight loss would go a long way both for his blood pressure cholesterol and decreasing his risk factors\par \par 3.  I will follow-up with the patient again in 3 to 4 months.  We will consider noninvasive testing in the near future to reevaluate his coronary status\par \par Addendum: The wife called but apparently he is taking amlodipine 5 mg not 2.5.  I therefore suggested that he increase the amlodipine to 5 mg twice a day for better blood pressure control

## 2021-10-29 NOTE — ASSESSMENT
[FreeTextEntry1] : Emory Newman 66 years old history of gout, hypertension, status post remote angioplasty relatively asymptomatic except for an elevated BMI.  His blood pressure today is not well controlled.  He is intolerant to most statins and his LDL is 90.  Overall the patient is stable except for his blood pressure not being under ideal control\par \par 1.  Elevated BMI\par 2.  Chronic ischemic heart disease status post PTCI no active angina EKG unremarkable\par 3.  History of gout on allopurinol and as needed colchicine\par 4.  Hyperlipidemia LDL below 100 on fluvastatin 80 and Zetia intolerant to other statins\par 5.  Hypertension not ideally controlled today

## 2021-10-29 NOTE — REASON FOR VISIT
[FreeTextEntry1] : Emory Newman 66 years old previously seen in my prior office here for evaluation of his cardiac status and blood pressure

## 2021-10-29 NOTE — PHYSICAL EXAM
[Well Developed] : well developed [Well Nourished] : well nourished [No Carotid Bruit] : no carotid bruit [Normal S1, S2] : normal S1, S2 [No Murmur] : no murmur [Soft] : abdomen soft [Normal Gait] : normal gait [No Edema] : no edema [Normal DP B/L] : normal DP B/L [Moves all extremities] : moves all extremities [Alert and Oriented] : alert and oriented [de-identified] : Overweight [de-identified] : Protuberant abdomen no masses felt

## 2021-10-29 NOTE — HISTORY OF PRESENT ILLNESS
[FreeTextEntry1] : Emory is 66 years old has a history of hypertension and hyperlipidemia and gout.\par \par He has a history of coronary disease status post remote stent.  When he presented with his initial stent he apparently had a non-ST elevation myocardial infarction\par \par From a cardiac point of view he has been stable for many years.  He is overweight and has hyperlipidemia intolerant to most statins now on fluvastatin\par \par He has a history of renal carcinoma status post nephrectomy and does have some chronic renal insufficiency\par \par Presently he denies exertional chest pain exertional shortness of breath palpitations dizziness or syncope.  He is not that active presently but does use the [employer] and has no difficulty with this.  He used to go to the gym and had no difficulty with that but because of COVID-19 he does not use the gym\par \par He presently is on amlodipine 2.5 and 75 mg of Toprol.  He is no longer on an ARB\par He denies chest pain chest pressure palpitations.  He remains overweight though he claims he is lost a few pounds.\par \par Recent blood work hemoglobin 16.6 hematocrit 50.5\par Creatinine 1.56 BUN 29 glucose 124 potassium 4.7 sodium 139\par Lipid panel triglycerides 134 cholesterol 165 HDL 45 LDL 93\par Uric acid 5.9

## 2022-01-10 ENCOUNTER — NON-APPOINTMENT (OUTPATIENT)
Age: 67
End: 2022-01-10

## 2022-01-10 ENCOUNTER — APPOINTMENT (OUTPATIENT)
Dept: CARDIOLOGY | Facility: CLINIC | Age: 67
End: 2022-01-10
Payer: COMMERCIAL

## 2022-01-10 VITALS
HEIGHT: 71 IN | WEIGHT: 252 LBS | HEART RATE: 67 BPM | SYSTOLIC BLOOD PRESSURE: 164 MMHG | BODY MASS INDEX: 35.28 KG/M2 | OXYGEN SATURATION: 97 % | DIASTOLIC BLOOD PRESSURE: 91 MMHG

## 2022-01-10 DIAGNOSIS — R07.89 OTHER CHEST PAIN: ICD-10-CM

## 2022-01-10 PROCEDURE — 99214 OFFICE O/P EST MOD 30 MIN: CPT

## 2022-01-10 PROCEDURE — 93000 ELECTROCARDIOGRAM COMPLETE: CPT

## 2022-01-10 RX ORDER — TADALAFIL 20 MG/1
20 TABLET ORAL
Qty: 10 | Refills: 3 | Status: DISCONTINUED | COMMUNITY
Start: 2020-02-27 | End: 2022-01-10

## 2022-01-10 RX ORDER — FLUTICASONE PROPIONATE 220 UG/1
220 AEROSOL, METERED RESPIRATORY (INHALATION)
Qty: 12 | Refills: 0 | Status: DISCONTINUED | COMMUNITY
Start: 2018-02-21 | End: 2022-01-10

## 2022-01-10 RX ORDER — ALBUTEROL SULFATE 90 UG/1
108 (90 BASE) AEROSOL, METERED RESPIRATORY (INHALATION)
Qty: 18 | Refills: 0 | Status: DISCONTINUED | COMMUNITY
Start: 2018-03-30 | End: 2022-01-10

## 2022-01-10 RX ORDER — COLCHICINE 0.6 MG/1
0.6 TABLET ORAL
Qty: 14 | Refills: 3 | Status: DISCONTINUED | COMMUNITY
Start: 2018-04-20 | End: 2022-01-10
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
## 2022-01-10 NOTE — DISCUSSION/SUMMARY
[FreeTextEntry1] : 1.  Add losartan 50 mg to Norvasc 5 mg twice a day and Toprol-XL 75 mg a day\par 2.  I had a long discussion with him at the importance of diet weight loss and maintaining a low-salt diet which he is not careful about\par 3.  As he is having chest discomfort although with atypical features for angina, he does have risk factors and prior known coronary disease.  I suggested that he undergo a noninvasive test with a pharmacologic nuclear stress test which I will arrange as soon as possible\par

## 2022-01-10 NOTE — PHYSICAL EXAM
[Well Developed] : well developed [Well Nourished] : well nourished [No Carotid Bruit] : no carotid bruit [Normal S1, S2] : normal S1, S2 [No Murmur] : no murmur [Soft] : abdomen soft [Normal Gait] : normal gait [No Edema] : no edema [Normal DP B/L] : normal DP B/L [Moves all extremities] : moves all extremities [Alert and Oriented] : alert and oriented [de-identified] : Overweight [de-identified] : Protuberant abdomen no masses felt

## 2022-01-10 NOTE — ASSESSMENT
[FreeTextEntry1] : Emory Newman 66 years old history of gout, hypertension, status post remote angioplasty relatively asymptomatic except for an elevated BMI.  His blood pressure today is not well controlled.  He is intolerant to most statins and his LDL is 93 on fluvastatin 80 Zetia 10.  Overall the patient is stable except for his blood pressure not being under ideal control.  He is now complaining of some chest discomfort somewhat burning not really exertional but present almost every day for the last few weeks\par \par 1.  Elevated BMI\par 2.  Chronic ischemic heart disease status post PTCI now with some chest burning somewhat atypical chest pain\par 3.  History of gout on allopurinol and as needed colchicine\par 4.  Hyperlipidemia LDL below 100 on fluvastatin 80 and Zetia intolerant to other statins.  LDL is still not at goal of 70.\par 5.  Hypertension not ideally controlled today-patient is not careful with salt in the diet and continues to be overweight

## 2022-01-11 ENCOUNTER — RX RENEWAL (OUTPATIENT)
Age: 67
End: 2022-01-11

## 2022-01-14 ENCOUNTER — APPOINTMENT (OUTPATIENT)
Dept: CARDIOLOGY | Facility: CLINIC | Age: 67
End: 2022-01-14
Payer: COMMERCIAL

## 2022-01-14 PROCEDURE — 93015 CV STRESS TEST SUPVJ I&R: CPT

## 2022-01-14 PROCEDURE — A9500: CPT

## 2022-01-14 PROCEDURE — 78452 HT MUSCLE IMAGE SPECT MULT: CPT

## 2022-02-08 ENCOUNTER — APPOINTMENT (OUTPATIENT)
Dept: CARDIOLOGY | Facility: CLINIC | Age: 67
End: 2022-02-08
Payer: COMMERCIAL

## 2022-02-08 VITALS
SYSTOLIC BLOOD PRESSURE: 132 MMHG | WEIGHT: 252 LBS | HEART RATE: 71 BPM | OXYGEN SATURATION: 97 % | DIASTOLIC BLOOD PRESSURE: 84 MMHG | HEIGHT: 71 IN | BODY MASS INDEX: 35.28 KG/M2

## 2022-02-08 PROCEDURE — 93306 TTE W/DOPPLER COMPLETE: CPT

## 2022-02-08 PROCEDURE — 99213 OFFICE O/P EST LOW 20 MIN: CPT
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
## 2022-02-08 NOTE — PHYSICAL EXAM
[Well Developed] : well developed [Well Nourished] : well nourished [No Carotid Bruit] : no carotid bruit [Normal S1, S2] : normal S1, S2 [No Murmur] : no murmur [Clear Lung Fields] : clear lung fields [Soft] : abdomen soft [Normal Gait] : normal gait [No Edema] : no edema [Normal DP B/L] : normal DP B/L [Moves all extremities] : moves all extremities [Alert and Oriented] : alert and oriented [de-identified] : Overweight [de-identified] : No rales rhonchi or wheezes [de-identified] : Protuberant abdomen no masses felt

## 2022-02-08 NOTE — ASSESSMENT
[FreeTextEntry1] : Emory Newman 66 years old history of gout, hypertension, status post remote angioplasty relatively asymptomatic except for an elevated BMI.  His blood pressure today is not well controlled.  He is intolerant to most statins and his LDL is 93 on fluvastatin 80 Zetia 10.  Overall the patient is stable except for his blood pressure not being under ideal control.  His work-up of atypical chest pain was negative.  He presently has no further chest pain feels well and has good exercise tolerance working out on a [employer] every day\par \par 1.  Elevated BMI-patient has marked abdominal obesity\par 2.  Chronic ischemic heart disease status post PTCI recent negative noninvasive cardiac work-up\par 3.  History of gout on allopurinol and as needed colchicine\par 4.  Hyperlipidemia LDL below 100 on fluvastatin 80 and Zetia intolerant to other statins.  LDL is still not at goal of 70.\par 5.  Hypertension well-controlled today on present regimen of medications including Norvasc and Toprol and now losartan 50 mg once a day

## 2022-02-08 NOTE — REASON FOR VISIT
[FreeTextEntry1] : Emory Newman returns for follow-up.  He recently saw me for atypical pain and had a noninvasive cardiac work-up including nuclear stress test and today an echocardiogram

## 2022-02-08 NOTE — DISCUSSION/SUMMARY
[FreeTextEntry1] : 1.  He will continue his present regimen of medications.\par 2.  I reassured him that his cardiac status was stable but I was very concerned about his weight which he is not made any progress in terms of losing.\par 3.  In addition his LDL is still above the level that we would like in someone with chronic coronary disease.\par \par I urged him to be on a lower caloric diet to really watch what he eats decrease the salt in his diet and continued exercise.\par \par It may be worthwhile for him to see a nutritionist but I think he is somewhat reluctant\par Routine follow with me again in 3 to 4 months.  Repeat lipid panel at that time

## 2022-03-10 ENCOUNTER — RX RENEWAL (OUTPATIENT)
Age: 67
End: 2022-03-10

## 2022-04-07 ENCOUNTER — RX RENEWAL (OUTPATIENT)
Age: 67
End: 2022-04-07

## 2022-04-18 ENCOUNTER — RX RENEWAL (OUTPATIENT)
Age: 67
End: 2022-04-18

## 2022-05-09 ENCOUNTER — RX RENEWAL (OUTPATIENT)
Age: 67
End: 2022-05-09

## 2022-05-20 ENCOUNTER — INPATIENT (INPATIENT)
Facility: HOSPITAL | Age: 67
LOS: 1 days | Discharge: HOME | DRG: 229 | End: 2022-05-22
Attending: STUDENT IN AN ORGANIZED HEALTH CARE EDUCATION/TRAINING PROGRAM | Admitting: INTERNAL MEDICINE
Payer: MEDICARE

## 2022-05-20 VITALS
RESPIRATION RATE: 20 BRPM | SYSTOLIC BLOOD PRESSURE: 144 MMHG | HEART RATE: 76 BPM | WEIGHT: 259.93 LBS | HEIGHT: 71 IN | TEMPERATURE: 98 F | DIASTOLIC BLOOD PRESSURE: 74 MMHG | OXYGEN SATURATION: 96 %

## 2022-05-20 DIAGNOSIS — Z98.890 OTHER SPECIFIED POSTPROCEDURAL STATES: Chronic | ICD-10-CM

## 2022-05-20 DIAGNOSIS — Z98.61 CORONARY ANGIOPLASTY STATUS: Chronic | ICD-10-CM

## 2022-05-20 PROCEDURE — 71045 X-RAY EXAM CHEST 1 VIEW: CPT | Mod: 26

## 2022-05-20 PROCEDURE — 93010 ELECTROCARDIOGRAM REPORT: CPT

## 2022-05-20 PROCEDURE — 99283 EMERGENCY DEPT VISIT LOW MDM: CPT

## 2022-05-20 PROCEDURE — 99285 EMERGENCY DEPT VISIT HI MDM: CPT

## 2022-05-20 RX ORDER — EZETIMIBE 10 MG/1
1 TABLET ORAL
Qty: 0 | Refills: 0 | DISCHARGE

## 2022-05-20 RX ORDER — PANTOPRAZOLE SODIUM 20 MG/1
1 TABLET, DELAYED RELEASE ORAL
Qty: 0 | Refills: 0 | DISCHARGE

## 2022-05-20 RX ORDER — AMLODIPINE BESYLATE 2.5 MG/1
1 TABLET ORAL
Qty: 0 | Refills: 0 | DISCHARGE

## 2022-05-20 RX ORDER — FEBUXOSTAT 40 MG/1
1 TABLET ORAL
Qty: 0 | Refills: 0 | DISCHARGE

## 2022-05-20 RX ORDER — CHOLECALCIFEROL (VITAMIN D3) 125 MCG
1 CAPSULE ORAL
Qty: 0 | Refills: 0 | DISCHARGE

## 2022-05-20 RX ORDER — ASPIRIN/CALCIUM CARB/MAGNESIUM 324 MG
1 TABLET ORAL
Qty: 0 | Refills: 0 | DISCHARGE

## 2022-05-20 RX ORDER — METOPROLOL TARTRATE 50 MG
0 TABLET ORAL
Qty: 0 | Refills: 0 | DISCHARGE

## 2022-05-20 RX ORDER — FLUVASTATIN SODIUM 80 MG/1
1 TABLET, FILM COATED, EXTENDED RELEASE ORAL
Qty: 0 | Refills: 0 | DISCHARGE

## 2022-05-20 RX ORDER — SODIUM CHLORIDE 9 MG/ML
1000 INJECTION INTRAMUSCULAR; INTRAVENOUS; SUBCUTANEOUS ONCE
Refills: 0 | Status: COMPLETED | OUTPATIENT
Start: 2022-05-20 | End: 2022-05-20

## 2022-05-20 NOTE — ED ADULT NURSE NOTE - NSICDXPASTMEDICALHX_GEN_ALL_CORE_FT
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

## 2022-05-20 NOTE — ED ADULT NURSE NOTE - NSIMPLEMENTINTERV_GEN_ALL_ED
Implemented All Universal Safety Interventions:  [place] to call system. Call bell, personal items and telephone within reach. Instruct patient to call for assistance. Room bathroom lighting operational. Non-slip footwear when patient is off stretcher. Physically safe environment: no spills, clutter or unnecessary equipment. Stretcher in lowest position, wheels locked, appropriate side rails in place.

## 2022-05-20 NOTE — ED ADULT NURSE NOTE - NSICDXFAMILYHX_GEN_ALL_CORE_FT
FAMILY HISTORY:  Father  Still living? No  Family history of colon cancer, Age at diagnosis: Age Unknown  Family history of heart attack, Age at diagnosis: Age Unknown  Family history of prostate cancer, Age at diagnosis: Age Unknown    Mother  Still living? No  Family history of colon cancer, Age at diagnosis: Age Unknown    Sibling  Still living? Yes, Estimated age: Age Unknown  Family history of prostate cancer, Age at diagnosis: Age Unknown

## 2022-05-20 NOTE — ED ADULT TRIAGE NOTE - AS TEMP SITE
----- Message from Ban Guzmán DO sent at 10/15/2020  8:33 AM CDT -----  Results review as follows.  1. Lithium slightly below reference range.  2. CMP and CBC essentially normal with flagged anion gap not clinically significant.    Recommend to continue follow ups with psychiatry for medication management given lithium level. If any concerns/questions, please contact us.   oral

## 2022-05-20 NOTE — ED ADULT TRIAGE NOTE - CHIEF COMPLAINT QUOTE
BIB ambulance for syncopal episode, vomiting x1 at home. "I passed out when I was trying to vomit". EMS states syncopal episode in ambulance x1. denies chest pain or abd pain. Zofran 4 mg IVP given on ambulance. Pt states hx same alix 3 years ago. "I was told I was dehydrated".

## 2022-05-20 NOTE — ED ADULT NURSE NOTE - CHPI ED NUR SYMPTOMS NEG
no back pain/no chest pain/no chills/no congestion/no diaphoresis/no dizziness/no fever/no shortness of breath/no syncope

## 2022-05-20 NOTE — ED ADULT NURSE NOTE - NSICDXPASTSURGICALHX_GEN_ALL_CORE_FT
PAST SURGICAL HISTORY:  Arthroplasty, Knee h/o left    H/O coronary angioplasty 1 stent 2009    History of foot surgery right

## 2022-05-21 DIAGNOSIS — R73.03 PREDIABETES: ICD-10-CM

## 2022-05-21 DIAGNOSIS — R55 SYNCOPE AND COLLAPSE: ICD-10-CM

## 2022-05-21 DIAGNOSIS — N18.30 CHRONIC KIDNEY DISEASE, STAGE 3 UNSPECIFIED: ICD-10-CM

## 2022-05-21 DIAGNOSIS — I10 ESSENTIAL (PRIMARY) HYPERTENSION: ICD-10-CM

## 2022-05-21 DIAGNOSIS — Z29.9 ENCOUNTER FOR PROPHYLACTIC MEASURES, UNSPECIFIED: ICD-10-CM

## 2022-05-21 DIAGNOSIS — I25.10 ATHEROSCLEROTIC HEART DISEASE OF NATIVE CORONARY ARTERY WITHOUT ANGINA PECTORIS: ICD-10-CM

## 2022-05-21 DIAGNOSIS — E66.9 OBESITY, UNSPECIFIED: ICD-10-CM

## 2022-05-21 DIAGNOSIS — M10.9 GOUT, UNSPECIFIED: ICD-10-CM

## 2022-05-21 DIAGNOSIS — E78.5 HYPERLIPIDEMIA, UNSPECIFIED: ICD-10-CM

## 2022-05-21 LAB
ALBUMIN SERPL ELPH-MCNC: 4 G/DL — SIGNIFICANT CHANGE UP (ref 3.3–5)
ALP SERPL-CCNC: 124 U/L — HIGH (ref 30–120)
ALT FLD-CCNC: 24 U/L DA — SIGNIFICANT CHANGE UP (ref 10–60)
ANION GAP SERPL CALC-SCNC: 6 MMOL/L — SIGNIFICANT CHANGE UP (ref 5–17)
ANION GAP SERPL CALC-SCNC: 8 MMOL/L — SIGNIFICANT CHANGE UP (ref 5–17)
AST SERPL-CCNC: 18 U/L — SIGNIFICANT CHANGE UP (ref 10–40)
BASOPHILS # BLD AUTO: 0.03 K/UL — SIGNIFICANT CHANGE UP (ref 0–0.2)
BASOPHILS NFR BLD AUTO: 0.3 % — SIGNIFICANT CHANGE UP (ref 0–2)
BILIRUB SERPL-MCNC: 0.4 MG/DL — SIGNIFICANT CHANGE UP (ref 0.2–1.2)
BUN SERPL-MCNC: 29 MG/DL — HIGH (ref 7–23)
BUN SERPL-MCNC: 30 MG/DL — HIGH (ref 7–23)
CALCIUM SERPL-MCNC: 8.4 MG/DL — SIGNIFICANT CHANGE UP (ref 8.4–10.5)
CALCIUM SERPL-MCNC: 9 MG/DL — SIGNIFICANT CHANGE UP (ref 8.4–10.5)
CHLORIDE SERPL-SCNC: 103 MMOL/L — SIGNIFICANT CHANGE UP (ref 96–108)
CHLORIDE SERPL-SCNC: 104 MMOL/L — SIGNIFICANT CHANGE UP (ref 96–108)
CK MB BLD-MCNC: 2.3 % — SIGNIFICANT CHANGE UP (ref 0–3.5)
CK MB CFR SERPL CALC: 1.3 NG/ML — SIGNIFICANT CHANGE UP (ref 0–3.6)
CK SERPL-CCNC: 57 U/L — SIGNIFICANT CHANGE UP (ref 39–308)
CO2 SERPL-SCNC: 27 MMOL/L — SIGNIFICANT CHANGE UP (ref 22–31)
CO2 SERPL-SCNC: 27 MMOL/L — SIGNIFICANT CHANGE UP (ref 22–31)
CREAT SERPL-MCNC: 1.67 MG/DL — HIGH (ref 0.5–1.3)
CREAT SERPL-MCNC: 1.78 MG/DL — HIGH (ref 0.5–1.3)
EGFR: 41 ML/MIN/1.73M2 — LOW
EGFR: 44 ML/MIN/1.73M2 — LOW
EOSINOPHIL # BLD AUTO: 0.02 K/UL — SIGNIFICANT CHANGE UP (ref 0–0.5)
EOSINOPHIL NFR BLD AUTO: 0.2 % — SIGNIFICANT CHANGE UP (ref 0–6)
GLUCOSE SERPL-MCNC: 126 MG/DL — HIGH (ref 70–99)
GLUCOSE SERPL-MCNC: 152 MG/DL — HIGH (ref 70–99)
HCT VFR BLD CALC: 44.6 % — SIGNIFICANT CHANGE UP (ref 39–50)
HCT VFR BLD CALC: 45.4 % — SIGNIFICANT CHANGE UP (ref 39–50)
HGB BLD-MCNC: 14.5 G/DL — SIGNIFICANT CHANGE UP (ref 13–17)
HGB BLD-MCNC: 15.1 G/DL — SIGNIFICANT CHANGE UP (ref 13–17)
IMM GRANULOCYTES NFR BLD AUTO: 1.1 % — SIGNIFICANT CHANGE UP (ref 0–1.5)
LYMPHOCYTES # BLD AUTO: 0.79 K/UL — LOW (ref 1–3.3)
LYMPHOCYTES # BLD AUTO: 8 % — LOW (ref 13–44)
MAGNESIUM SERPL-MCNC: 1.8 MG/DL — SIGNIFICANT CHANGE UP (ref 1.6–2.6)
MAGNESIUM SERPL-MCNC: 1.9 MG/DL — SIGNIFICANT CHANGE UP (ref 1.6–2.6)
MCHC RBC-ENTMCNC: 29.7 PG — SIGNIFICANT CHANGE UP (ref 27–34)
MCHC RBC-ENTMCNC: 30.1 PG — SIGNIFICANT CHANGE UP (ref 27–34)
MCHC RBC-ENTMCNC: 32.5 GM/DL — SIGNIFICANT CHANGE UP (ref 32–36)
MCHC RBC-ENTMCNC: 33.3 GM/DL — SIGNIFICANT CHANGE UP (ref 32–36)
MCV RBC AUTO: 90.6 FL — SIGNIFICANT CHANGE UP (ref 80–100)
MCV RBC AUTO: 91.2 FL — SIGNIFICANT CHANGE UP (ref 80–100)
MONOCYTES # BLD AUTO: 0.4 K/UL — SIGNIFICANT CHANGE UP (ref 0–0.9)
MONOCYTES NFR BLD AUTO: 4 % — SIGNIFICANT CHANGE UP (ref 2–14)
NEUTROPHILS # BLD AUTO: 8.57 K/UL — HIGH (ref 1.8–7.4)
NEUTROPHILS NFR BLD AUTO: 86.4 % — HIGH (ref 43–77)
NRBC # BLD: 0 /100 WBCS — SIGNIFICANT CHANGE UP (ref 0–0)
NRBC # BLD: 0 /100 WBCS — SIGNIFICANT CHANGE UP (ref 0–0)
NT-PROBNP SERPL-SCNC: 120 PG/ML — SIGNIFICANT CHANGE UP (ref 0–125)
PHOSPHATE SERPL-MCNC: 3.1 MG/DL — SIGNIFICANT CHANGE UP (ref 2.5–4.5)
PHOSPHATE SERPL-MCNC: 4.1 MG/DL — SIGNIFICANT CHANGE UP (ref 2.5–4.5)
PLATELET # BLD AUTO: 146 K/UL — LOW (ref 150–400)
PLATELET # BLD AUTO: 158 K/UL — SIGNIFICANT CHANGE UP (ref 150–400)
POTASSIUM SERPL-MCNC: 4.6 MMOL/L — SIGNIFICANT CHANGE UP (ref 3.5–5.3)
POTASSIUM SERPL-MCNC: 4.7 MMOL/L — SIGNIFICANT CHANGE UP (ref 3.5–5.3)
POTASSIUM SERPL-SCNC: 4.6 MMOL/L — SIGNIFICANT CHANGE UP (ref 3.5–5.3)
POTASSIUM SERPL-SCNC: 4.7 MMOL/L — SIGNIFICANT CHANGE UP (ref 3.5–5.3)
PROT SERPL-MCNC: 6.9 G/DL — SIGNIFICANT CHANGE UP (ref 6–8.3)
RBC # BLD: 4.89 M/UL — SIGNIFICANT CHANGE UP (ref 4.2–5.8)
RBC # BLD: 5.01 M/UL — SIGNIFICANT CHANGE UP (ref 4.2–5.8)
RBC # FLD: 13.1 % — SIGNIFICANT CHANGE UP (ref 10.3–14.5)
RBC # FLD: 13.2 % — SIGNIFICANT CHANGE UP (ref 10.3–14.5)
SARS-COV-2 RNA SPEC QL NAA+PROBE: SIGNIFICANT CHANGE UP
SODIUM SERPL-SCNC: 136 MMOL/L — SIGNIFICANT CHANGE UP (ref 135–145)
SODIUM SERPL-SCNC: 139 MMOL/L — SIGNIFICANT CHANGE UP (ref 135–145)
TROPONIN I, HIGH SENSITIVITY RESULT: 6 NG/L — SIGNIFICANT CHANGE UP
TROPONIN I, HIGH SENSITIVITY RESULT: 6.5 NG/L — SIGNIFICANT CHANGE UP
WBC # BLD: 8.18 K/UL — SIGNIFICANT CHANGE UP (ref 3.8–10.5)
WBC # BLD: 9.92 K/UL — SIGNIFICANT CHANGE UP (ref 3.8–10.5)
WBC # FLD AUTO: 8.18 K/UL — SIGNIFICANT CHANGE UP (ref 3.8–10.5)
WBC # FLD AUTO: 9.92 K/UL — SIGNIFICANT CHANGE UP (ref 3.8–10.5)

## 2022-05-21 PROCEDURE — 99223 1ST HOSP IP/OBS HIGH 75: CPT

## 2022-05-21 PROCEDURE — 99233 SBSQ HOSP IP/OBS HIGH 50: CPT

## 2022-05-21 PROCEDURE — 93306 TTE W/DOPPLER COMPLETE: CPT | Mod: 26

## 2022-05-21 PROCEDURE — 70450 CT HEAD/BRAIN W/O DYE: CPT | Mod: 26,MA

## 2022-05-21 RX ORDER — ALLOPURINOL 300 MG
200 TABLET ORAL DAILY
Refills: 0 | Status: DISCONTINUED | OUTPATIENT
Start: 2022-05-21 | End: 2022-05-21

## 2022-05-21 RX ORDER — PANTOPRAZOLE SODIUM 20 MG/1
40 TABLET, DELAYED RELEASE ORAL
Refills: 0 | Status: DISCONTINUED | OUTPATIENT
Start: 2022-05-21 | End: 2022-05-21

## 2022-05-21 RX ORDER — LOSARTAN POTASSIUM 100 MG/1
1 TABLET, FILM COATED ORAL
Qty: 0 | Refills: 0 | DISCHARGE

## 2022-05-21 RX ORDER — AMLODIPINE BESYLATE 2.5 MG/1
5 TABLET ORAL DAILY
Refills: 0 | Status: DISCONTINUED | OUTPATIENT
Start: 2022-05-21 | End: 2022-05-21

## 2022-05-21 RX ORDER — AMLODIPINE BESYLATE 2.5 MG/1
0 TABLET ORAL
Qty: 0 | Refills: 0 | DISCHARGE

## 2022-05-21 RX ORDER — ASPIRIN/CALCIUM CARB/MAGNESIUM 324 MG
81 TABLET ORAL DAILY
Refills: 0 | Status: DISCONTINUED | OUTPATIENT
Start: 2022-05-21 | End: 2022-05-21

## 2022-05-21 RX ORDER — ALLOPURINOL 300 MG
200 TABLET ORAL
Qty: 0 | Refills: 0 | DISCHARGE

## 2022-05-21 RX ORDER — CHLORHEXIDINE GLUCONATE 213 G/1000ML
1 SOLUTION TOPICAL
Refills: 0 | Status: DISCONTINUED | OUTPATIENT
Start: 2022-05-21 | End: 2022-05-21

## 2022-05-21 RX ORDER — CHOLECALCIFEROL (VITAMIN D3) 125 MCG
1000 CAPSULE ORAL DAILY
Refills: 0 | Status: DISCONTINUED | OUTPATIENT
Start: 2022-05-21 | End: 2022-05-21

## 2022-05-21 RX ORDER — ACETAMINOPHEN 500 MG
650 TABLET ORAL ONCE
Refills: 0 | Status: COMPLETED | OUTPATIENT
Start: 2022-05-21 | End: 2022-05-21

## 2022-05-21 RX ORDER — ATORVASTATIN CALCIUM 80 MG/1
20 TABLET, FILM COATED ORAL AT BEDTIME
Refills: 0 | Status: DISCONTINUED | OUTPATIENT
Start: 2022-05-21 | End: 2022-05-21

## 2022-05-21 RX ORDER — ENOXAPARIN SODIUM 100 MG/ML
40 INJECTION SUBCUTANEOUS EVERY 12 HOURS
Refills: 0 | Status: DISCONTINUED | OUTPATIENT
Start: 2022-05-21 | End: 2022-05-21

## 2022-05-21 RX ORDER — LOSARTAN POTASSIUM 100 MG/1
50 TABLET, FILM COATED ORAL DAILY
Refills: 0 | Status: DISCONTINUED | OUTPATIENT
Start: 2022-05-21 | End: 2022-05-21

## 2022-05-21 RX ORDER — HEPARIN SODIUM 5000 [USP'U]/ML
5000 INJECTION INTRAVENOUS; SUBCUTANEOUS EVERY 8 HOURS
Refills: 0 | Status: DISCONTINUED | OUTPATIENT
Start: 2022-05-21 | End: 2022-05-21

## 2022-05-21 RX ADMIN — Medication 200 MILLIGRAM(S): at 14:35

## 2022-05-21 RX ADMIN — PANTOPRAZOLE SODIUM 40 MILLIGRAM(S): 20 TABLET, DELAYED RELEASE ORAL at 06:25

## 2022-05-21 RX ADMIN — PANTOPRAZOLE SODIUM 40 MILLIGRAM(S): 20 TABLET, DELAYED RELEASE ORAL at 17:10

## 2022-05-21 RX ADMIN — Medication 1000 UNIT(S): at 14:22

## 2022-05-21 RX ADMIN — SODIUM CHLORIDE 1000 MILLILITER(S): 9 INJECTION INTRAMUSCULAR; INTRAVENOUS; SUBCUTANEOUS at 00:59

## 2022-05-21 RX ADMIN — AMLODIPINE BESYLATE 5 MILLIGRAM(S): 2.5 TABLET ORAL at 06:25

## 2022-05-21 RX ADMIN — LOSARTAN POTASSIUM 50 MILLIGRAM(S): 100 TABLET, FILM COATED ORAL at 06:25

## 2022-05-21 RX ADMIN — SODIUM CHLORIDE 1000 MILLILITER(S): 9 INJECTION INTRAMUSCULAR; INTRAVENOUS; SUBCUTANEOUS at 00:00

## 2022-05-21 RX ADMIN — Medication 81 MILLIGRAM(S): at 14:21

## 2022-05-21 NOTE — ED PROVIDER NOTE - OBJECTIVE STATEMENT
67 year old male with a history of CAD with 1 stent, HTN, HLD, obesity presents with syncopal episode x 2.  Patient states he ate chicken from a restaurant that made his feel unwell. He started to forcefully dry heave and then vomited once.  Wife found him in the bathroom with +LOC x 5-10 seconds.  EMS was called and patient was given Zofran 4mg IV.  EMS reports that patient had another syncopal episode en route.  No vomiting at this time, lasted again 5-10 seconds.  Patient denies chest pain, headache, SOB.  States this has occurred in the past, usually with vomiting and he has been dehydrated.  Cardiology Dr. Betancourt, PMD Dr. Faye

## 2022-05-21 NOTE — H&P ADULT - PROBLEM SELECTOR PLAN 6
baseline BUN/Cr unavailable at this time, renally dose meds & avoid nephrotoxins, monitor renal indices.

## 2022-05-21 NOTE — CONSULT NOTE ADULT - ATTENDING COMMENTS
66 yo M with hx of HTN, preDM, HLD, CAD s/p PCI in the past, CKD, renal ca s/p R partial nephrectomy, gout, and GERD presented as a transfer from [facility] for [facility]. Pt initially presented to [facility] with dizziness, "blacking out", and nausea/vomiting and was found to have 10 seconds pause on telemetry. Plan for PPM in AM. Will follow.

## 2022-05-21 NOTE — H&P ADULT - HISTORY OF PRESENT ILLNESS
This is a 66 y/o M with PMH of HTN, Pre DM, Dyslipidemia, CAD s/p MI s/p PCI, CKD, Renal CA s/p right partial nephrectomy, Gout, GERD, and Obesity who presented with a syncopal episode. Patient states that he had nausea couple of hours after having his dinner last night, went to the bathroom where he had a lot of dry heaving & cough, and he passed out. the episode was witnessed by wife who stated that he was stiff & markedly pale with eyes rolled up for about 10 seconds then regained consciousness spontaneously, no seizure activity, called the ambulance, EMS reported another similar episode en rout to the hospital, a third episode was witnessed by ED staff while on telemonitoring at the ED. Currently he reports feeling absolutely fine, stated that he had similar episodes in the past starting when he was 5 years old, another during his hospital stay for ortho surgery at [place] about 10 years ago, had w/u but everything was fine, was told that he had "vasovagal syndrome". Telemetry strip during the episode at the ED showed SR with complete heart block resulting in 10 seconds of asystole.

## 2022-05-21 NOTE — ED PROVIDER NOTE - MUSCULOSKELETAL, MLM
Spine appears normal, range of motion is not limited, no muscle or joint tenderness, 1+ LE pitting edema b/l

## 2022-05-21 NOTE — CHART NOTE - NSCHARTNOTEFT_GEN_A_CORE
Patient evaluated today and spoke to our Cardiologist (Dr. Lo) and his outpatient Cardiologist (Dr. Micheal Betancourt).  Shared our EKG's and Telemetry strips and noted to have Complete Heart Block with close to 10 seconds of Asystole. Patient is being transferred to [facility] for Pacemaker evaluation and higher level of care.

## 2022-05-21 NOTE — CONSULT NOTE ADULT - ASSESSMENT
Pt is a 66 y/o M pmhx of CAD w/ 1 stent in 2009, HTN, HLD, and obesity presents to [facility] ED w/ complaints of syncope and nausea and vomiting. Pt states the vomiting began after ingesting chicken for dinner and has been associated w/ syncopal episodes, which he states have occurred to him in the past. Pt received 4mg IVP of zofran by EMS w/ relief, upon admission to ED, pt experienced additional syncope episode associated w/ a 10-15 second sinus pause.    1. Syncope associated w/ sinus pause   2. JOHN     Plan:   Will admit to the SCU, place on continuous monitor and keep zoll at bedside.   - Stat mag and phos for any electrical abnormalities, will repleat as needed.   - Sinus rhythm on monitor now, will keep on continuous monitoring w/ zoll at bedside.   - Cardio evaluation in AM to see if pt requires any PPM vs any other implantable devices.   - JOHN secondary to likely fluid down state, will start on gradual IVF, continue to trend, avoid nephrotoxic medications.   - Currently tolerating RA, will supplement O2 as needed to maintain SpO2>92%.   - Currently HD stable, continue to monitor and maintain MAP>65.       Case discussed w/ EICU attending Dr. Donald.  Pt is a 68 y/o M pmhx of CAD w/ 1 stent in 2009, HTN, HLD, and obesity presents to [place] ED w/ complaints of syncope and nausea and vomiting. Pt states the vomiting began after ingesting chicken for dinner and has been associated w/ syncopal episodes, which he states have occurred to him in the past. Pt received 4mg IVP of zofran by EMS w/ relief, upon admission to ED, pt experienced additional syncope episode associated w/ a 10-15 second sinus pause.    1. Syncope associated w/ sinus pause   2. JOHN     Plan:   Will admit to the SCU, place on continuous monitor and keep zoll at bedside.   - Stat mag and phos for any electrical abnormalities, will repleat as needed.   - Sinus rhythm on monitor now, will keep on continuous monitoring w/ zoll at bedside.   - Cardio evaluation in AM to see if pt requires any PPM vs any other implantable devices.   - Will use zofran as needed to control nausea and vomiting.   - JOHN secondary to likely fluid down state, will start on gradual IVF, continue to trend, avoid nephrotoxic medications.   - Currently tolerating RA, will supplement O2 as needed to maintain SpO2>92%.   - Currently HD stable, continue to monitor and maintain MAP>65.   - Diet: Consistent carb, protonix for GI PPX  - Heparin for DVT PPX in setting of JOHN       Case discussed w/ EICU attending Dr. Donald.

## 2022-05-21 NOTE — CONSULT NOTE ADULT - SUBJECTIVE AND OBJECTIVE BOX
Patient is a 67y old  Male who presents with a chief complaint of     BRIEF HOSPITAL COURSE: Pt is a 66 y/o M pmhx of CAD w/ 1 stent in , HTN, HLD, and obesity presents to [facility] ED w/ complaints of syncope and nausea and vomiting. Pt states the vomiting began after ingesting chicken for dinner and has been associated w/ syncopal episodes, which he states have occurred to him in the past. Pt received 4mg IVP of zofran by EMS w/ relief, upon admission to ED, pt experienced additional syncope episode associated w/ a 10-15 second sinus pause. HX of vasovagal syndrome, last admitted for in 2018. ICU consulted for further management.       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      GERD (gastroesophageal reflux disease)      Arthroplasty, Knee  h/o left      H/O coronary angioplasty  1 stent       History of foot surgery  right        Allergies    No Known Allergies    Intolerances      FAMILY HISTORY:  Family history of heart attack (Father)    Family history of prostate cancer (Father, Sibling)  father     Family history of colon cancer (Father, Mother)        Review of Systems:  CONSTITUTIONAL: No fever, chills, or fatigue  EYES: No eye pain, visual disturbances, or discharge  ENMT:  No difficulty hearing, tinnitus, vertigo; No sinus or throat pain  NECK: No pain or stiffness  RESPIRATORY: No cough, wheezing, chills or hemoptysis; No shortness of breath  CARDIOVASCULAR: No chest pain, palpitations, dizziness, or leg swelling  GASTROINTESTINAL: No abdominal or epigastric pain. +nausea, vomiting since resolved last episode 2 hours ago. No hematemesis; No diarrhea or constipation. No melena or hematochezia.  GENITOURINARY: No dysuria, frequency, hematuria, or incontinence  NEUROLOGICAL: No headaches, memory loss, loss of strength, numbness, or tremors  SKIN: No itching, burning, rashes, or lesions   MUSCULOSKELETAL: No joint pain or swelling; No muscle, back, or extremity pain  PSYCHIATRIC: No depression, anxiety, mood swings, or difficulty sleeping      Medications:            heparin   Injectable 5000 Unit(s) SubCutaneous every 8 hours    pantoprazole  Injectable 40 milliGRAM(s) IV Push two times a day            chlorhexidine 2% Cloths 1 Application(s) Topical <User Schedule>            ICU Vital Signs Last 24 Hrs  T(C): 36.4 (20 May 2022 23:06), Max: 36.4 (20 May 2022 23:06)  T(F): 97.6 (20 May 2022 23:06), Max: 97.6 (20 May 2022 23:06)  HR: 80 (21 May 2022 01:40) (70 - 80)  BP: 130/58 (21 May 2022 01:40) (125/75 - 148/76)  BP(mean): --  ABP: --  ABP(mean): --  RR: 20 (21 May 2022 01:40) (14 - 20)  SpO2: 98% (21 May 2022 01:40) (96% - 98%)    Vital Signs Last 24 Hrs  T(C): 36.4 (20 May 2022 23:06), Max: 36.4 (20 May 2022 23:06)  T(F): 97.6 (20 May 2022 23:06), Max: 97.6 (20 May 2022 23:06)  HR: 80 (21 May 2022 01:40) (70 - 80)  BP: 130/58 (21 May 2022 01:40) (125/75 - 148/76)  BP(mean): --  RR: 20 (21 May 2022 01:40) (14 - 20)  SpO2: 98% (21 May 2022 01:40) (96% - 98%)        I&O's Detail        LABS:                        15.1   9.92  )-----------( 158      ( 20 May 2022 23:55 )             45.4     05-20    136  |  103  |  30<H>  ----------------------------<  152<H>  4.6   |  27  |  1.78<H>    Ca    9.0      20 May 2022 23:55    TPro  6.9  /  Alb  4.0  /  TBili  0.4  /  DBili  x   /  AST  18  /  ALT  24  /  AlkPhos  124<H>  05-20      CARDIAC MARKERS ( 20 May 2022 23:55 )  x     / x     / 57 U/L / x     / 1.3 ng/mL      CAPILLARY BLOOD GLUCOSE            CULTURES:      Physical Examination:    General: Obese male laying in hospital bed in no acute distress.  Alert, oriented, interactive.     HEENT: Pupils equal, reactive to light.  Symmetric.    PULM: Clear to auscultation bilaterally, no significant sputum production    CVS: Regular rate and rhythm, no murmurs, rubs, or gallops    ABD: Soft, distended, nontender, normoactive bowel sounds, no masses    EXT: No edema, nontender    SKIN: Warm and well perfused.     RADIOLOGY: < from: CT Head No Cont (22 @ 00:27) >  ACC: [id number] EXAM:  CT BRAIN                          PROCEDURE DATE:  2022          INTERPRETATION:  CLINICAL HISTORY:  Syncope.    TECHNIQUE:  CT of the head without contrast.  Contiguous transaxial images of the head were acquired from the skull   base to the vertex without the administration of iodinated contrast.   Coronal and sagittal reformatted images are provided.    COMPARISON: CT head 2014    FINDINGS:    There is no acute intracranial hemorrhage, vasogenic edema or evidence   for acute large vascular territory infarct. Tiny chronic lacunar infarct   suggested in the body of the right caudate nucleus (2-25). Stable minimal   chronic microvascular change.    The ventricles, sulci and cisternal spaces are stable in size and   configuration and appropriate for patient's stated age. There is no   midline shift or abnormal extra-axial fluid collection.    The calvarium is intact.  There are no osteoblastic or lytic calvarial or   skull base lesions.  The paranasal sinuses and mastoid air cells are   clear.    IMPRESSION:  Stable exam. No acute intracranial hemorrhage, vasogenic edema or   extra-axial collection.    --- End of Report ---            SUZETTE POSEY MD; Attending Radiologist  This document has been electronically signed. May 21 2022  1:33AM

## 2022-05-21 NOTE — H&P ADULT - PROBLEM SELECTOR PLAN 5
Continue Atorvastatin (formulary for fluvastatin), Zitia non formulary, wife will bring patient's own Zetia if he stays for tomorrow.

## 2022-05-21 NOTE — ED PROVIDER NOTE - CONSTITUTIONAL, MLM
Well appearing, awake, alert, oriented to person, place, time/situation and in no apparent distress.  Patient obese normal...

## 2022-05-21 NOTE — CONSULT NOTE ADULT - ASSESSMENT
68 yo M with hx of HTN, preDM, HLD, CAD s/p PCI in the past, CKD, renal ca s/p R partial nephrectomy, gout, and GERD presented as a transfer from [facility] for CHB. Pt initially presented to [facility] with dizziness, "blacking out", and nausea/vomiting and was found to have 10 seconds pause on telemetry. He also endorses multiple similar episodes throughout his life.    #CHB  - plan for PPM tomorrow am  - NPO after MN  - please have active T&S and COVID status for tomorrow am  - hold DVT ppx after 10p  - hold home AVN blockers  - TTE in [facility] showed very poor windows but grossly normal LVEF, would repeat TTE here if possible  - c/w telemetry  - keep K > 4, Mg > 2

## 2022-05-21 NOTE — H&P ADULT - NSHPPHYSICALEXAM_GEN_ALL_CORE
-    Vital Signs Last 24 Hrs  T(C): 36.4 (21 May 2022 02:55), Max: 36.7 (21 May 2022 02:34)  T(F): 97.5 (21 May 2022 02:55), Max: 98 (21 May 2022 02:34)  HR: 80 (21 May 2022 02:34) (70 - 80)  BP: 129/59 (21 May 2022 02:34) (125/75 - 148/76)  BP(mean): --  RR: 18 (21 May 2022 02:34) (14 - 20)  SpO2: 98% (21 May 2022 02:34) (96% - 98%)          PHYSICAL EXAM:  		  GENERAL: NAD, well-groomed, well-developed, obese.  HEAD:  Atraumatic, Norm cephalic.  EYES: PERRLA, conjunctiva clear.  ENMT: no nasal discharge, MMM.   NECK: Supple, No JVD.  NERVOUS SYSTEM:  Alert & oriented X3, neurologically intact grossly.  CHEST/LUNG: Good air entry B/L, no rales, rhonchi, or wheezing.  HEART: Normal S1 & S2, no murmurs, or extra sounds.  ABDOMEN: Soft, non-tender, obese non-distended; bowel sounds present, no palpable masses or organomegaly.  EXTREMITIES:  No clubbing or cyanosis, (+) soft pitting leg edema B/L.  VASCULAR: 2+ radial, DPA / PTA pulses B/L.  SKIN: No rashes or lesions.  PSYCH: normal affect & behavior.

## 2022-05-21 NOTE — H&P ADULT - NSHPLABSRESULTS_GEN_ALL_CORE
-                   15.1   9.92   )----------(  158       ( 20 May 2022 23:55 )               45.4      136    |  103    |  30     ----------------------------<  152        ( 20 May 2022 23:55 )  4.6     |  27     |  1.78     Ca    9.0        ( 20 May 2022 23:55 )  Phos  3.1       ( 21 May 2022 02:32 )  Mg     1.8       ( 21 May 2022 02:32 )    TPro  6.9    /  Alb  4.0    /  TBili  0.4    /  DBili  x      /  AST  18     /  ALT  24     /  AlkPhos  124    ( 20 May 2022 23:55 )    LIVER FUNCTIONS - ( 20 May 2022 23:55 )  Alb: 4.0 g/dL / Pro: 6.9 g/dL / ALK PHOS: 124 U/L / ALT: 24 U/L DA / AST: 18 U/L / GGT: x             CARDIAC MARKERS ( 20 May 2022 23:55 )  x     / x     / 57 U/L / x     / 1.3 ng/mL      COVID-19 PCR: NotDetec (20 May 2022 23:54)      Serum Pro-Brain Natriuretic Peptide (05.20.22 @ 23:55)   Serum Pro-Brain Natriuretic Peptide: 120 pg/mL          CT BRAIN                        PROCEDURE DATE:  05/21/2022    INTERPRETATION:  CLINICAL HISTORY:  Syncope.  TECHNIQUE:  CT of the head without contrast.  Contiguous transaxial images of the head were acquired from the skull   base to the vertex without the administration of iodinated contrast.   Coronal and sagittal reformatted images are provided.  COMPARISON: CT head 11/18/2014  FINDINGS:  There is no acute intracranial hemorrhage, vasogenic edema or evidence   for acute large vascular territory infarct. Tiny chronic lacunar infarct   suggested in the body of the right caudate nucleus (2-25). Stable minimal   chronic microvascular change.  The ventricles, sulci and cisternal spaces are stable in size and   configuration and appropriate for patient's stated age. There is no   midline shift or abnormal extra-axial fluid collection.  The calvarium is intact.  There are no osteoblastic or lytic calvarial or   skull base lesions.  The paranasal sinuses and mastoid air cells are   clear.  IMPRESSION:  Stable exam. No acute intracranial hemorrhage, vasogenic edema or   extra-axial collection. -                   15.1   9.92   )----------(  158       ( 20 May 2022 23:55 )               45.4      136    |  103    |  30     ----------------------------<  152        ( 20 May 2022 23:55 )  4.6     |  27     |  1.78     Ca    9.0        ( 20 May 2022 23:55 )  Phos  3.1       ( 21 May 2022 02:32 )  Mg     1.8       ( 21 May 2022 02:32 )    TPro  6.9    /  Alb  4.0    /  TBili  0.4    /  DBili  x      /  AST  18     /  ALT  24     /  AlkPhos  124    ( 20 May 2022 23:55 )    LIVER FUNCTIONS - ( 20 May 2022 23:55 )  Alb: 4.0 g/dL / Pro: 6.9 g/dL / ALK PHOS: 124 U/L / ALT: 24 U/L DA / AST: 18 U/L / GGT: x             CARDIAC MARKERS ( 20 May 2022 23:55 )  x     / x     / 57 U/L / x     / 1.3 ng/mL      COVID-19 PCR: NotDetec (20 May 2022 23:54)      Serum Pro-Brain Natriuretic Peptide (05.20.22 @ 23:55)   Serum Pro-Brain Natriuretic Peptide: 120 pg/mL          CT BRAIN                        PROCEDURE DATE:  05/21/2022    INTERPRETATION:  CLINICAL HISTORY:  Syncope.  TECHNIQUE:  CT of the head without contrast.  Contiguous transaxial images of the head were acquired from the skull   base to the vertex without the administration of iodinated contrast.   Coronal and sagittal reformatted images are provided.  COMPARISON: CT head 11/18/2014  FINDINGS:  There is no acute intracranial hemorrhage, vasogenic edema or evidence   for acute large vascular territory infarct. Tiny chronic lacunar infarct   suggested in the body of the right caudate nucleus (2-25). Stable minimal   chronic microvascular change.  The ventricles, sulci and cisternal spaces are stable in size and   configuration and appropriate for patient's stated age. There is no   midline shift or abnormal extra-axial fluid collection.  The calvarium is intact.  There are no osteoblastic or lytic calvarial or   skull base lesions.  The paranasal sinuses and mastoid air cells are   clear.  IMPRESSION:  Stable exam. No acute intracranial hemorrhage, vasogenic edema or   extra-axial collection.          CXR:    As per my review shows elevated right hemidiaphragm, normal cardiac shadow size, clear lung fields B/L, no pulmonary infiltrates, pleural effusion, or pneumothorax. Pending official report.           EKG:    As per my review shows SR at 75/min, normal NE & QTc intervals, normal QRS voltage, duration, and axis (+60), with normal transition, no ST-T abnormality.      -

## 2022-05-21 NOTE — H&P ADULT - NSHPREVIEWOFSYSTEMS_GEN_ALL_CORE
-    CONSTITUTIONAL: No fever or chills.  EYES: No eye pain, visual disturbances, or discharge.  ENMT:  (+) longstanding difficulty hearing, no vertigo, sinus or throat pain.  NECK: No pain or stiffness.	  RESPIRATORY: No current cough, wheezing, or hemoptysis; No shortness of breath.  CARDIOVASCULAR: No chest pain, palpitations, or current dizziness, no leg swelling.  GASTROINTESTINAL: No abdominal pain, no current nausea or vomiting, no hematemesis; No diarrhea or Change in bowel habits. No melena or hematochezia.  GENITOURINARY: No dysuria, frequency, hematuria, or incontinence.  NEUROLOGICAL: No headaches, focal muscle weakness, numbness, or tremors.  SKIN: No itching, burning or rashes.  MUSCULOSKELETAL: No joint swelling or pain.  PSYCHIATRIC: No depression, anxiety, or agitation.  HEME/LYMPH: No easy bruising, bleeding gums, or nose bleed.  ALLERGY AND IMMUNOLOGIC: No hives or eczema.

## 2022-05-21 NOTE — PATIENT PROFILE ADULT - FALL HARM RISK - HARM RISK INTERVENTIONS

## 2022-05-21 NOTE — H&P ADULT - PROBLEM SELECTOR PLAN 1
3 episodes in the setting of vomiting & dry heaving ML related to vagal overtone, all were witnessed, no dysrhythmia between the episodes, negative 1st troponin, admitted to [facility], stand by transcutaneous PMR, hold Metoprolol, repeat troponin in am, TTE in am, may need to consider demand PMR placement, cardiology consult with Dr. Springer was called. 3 episodes in the setting of vomiting & dry heaving ML related to vagal overtone, all were witnessed, no dysrhythmia between the episodes, negative 1st troponin, admitted to [facility], stand by transcutaneous PMKR, hold Metoprolol, repeat troponin in am, TTE in am, may need to consider demand PPM placement, cardiology consult with Dr. Springer was called.

## 2022-05-21 NOTE — H&P ADULT - PROBLEM SELECTOR PLAN 3
s/p PCI about 10 years ago following an MI, stable, no chest pain or ischemic EKG changes, continue low dose Aspirin & Atorvastatin, hold Metoprolol for now, cardiology consulted.

## 2022-05-21 NOTE — ED PROVIDER NOTE - PROGRESS NOTE DETAILS
Patient had a syncopal episode in ED.  Became pale and bradycardic, lasted 10 seconds before patient came to.  No vomiting.  Patient offered no complaints. States the IV insertion made him feel light-headed.

## 2022-05-21 NOTE — CONSULT NOTE ADULT - ASSESSMENT
68y/o over weight. Seen at [facility] ICU  History vaso-vagal, high cholesterol  Father s/p MI  S/P MI and stent about 8 years ago  S/P left knee surgery  S/P right ankle surgery    Admitted for vomiting and fainting  Had episode of syncope while EMS was with patient and monitor revealed an arrhythmia  Last night about 9:50 pm had about 10 seconds of asytole with syncope and no precipitating factors  BUN/Creat-29/1.67  Patient states that he had a normal Nuclear Stress test 4-5 months ago    Plan:  - Will transfer patient to [facility] for pacemaker  - Case discussed with Dr. Betancourt  - Diagnosis, prognosis, risks and benefits fully discussed

## 2022-05-21 NOTE — ED PROVIDER NOTE - CLINICAL SUMMARY MEDICAL DECISION MAKING FREE TEXT BOX
67 year old male with HLD, HTN, CAD p/w syncope x 2, witnessed by wife and EMS.  Vomited at home and reports prior syncopal episodes with dehydration/vomiting in the past.  No chest pain, headache.  Check labs, CXR, CT head, hydrate, cardiology consult, likely admission

## 2022-05-21 NOTE — H&P ADULT - PROBLEM SELECTOR PLAN 8
never diagnosed with SHILO, patient & wife at the bedside deny any symptoms suggestive of SHILO, monitor.

## 2022-05-21 NOTE — CONSULT NOTE ADULT - SUBJECTIVE AND OBJECTIVE BOX
Patient seen and evaluated at bedside    Chief Complaint: dizziness, nausea, vomiting    HPI:  This is a 66 y/o M with PMH of HTN, Pre DM, Dyslipidemia, CAD s/p MI s/p PCI, CKD, Renal CA s/p right partial nephrectomy, Gout, GERD, and Obesity who presented with a syncopal episode. Patient states that he had nausea couple of hours after having his dinner last night, went to the bathroom where he had a lot of dry heaving & cough, and he passed out. the episode was witnessed by wife who stated that he was stiff & markedly pale with eyes rolled up for about 10 seconds then regained consciousness spontaneously, no seizure activity, called the ambulance, EMS reported another similar episode en rout to the hospital, a third episode was witnessed by ED staff while on telemonitoring at the ED. Currently he reports feeling absolutely fine, stated that he had similar episodes in the past starting when he was 5 years old, another during his hospital stay for ortho surgery at [place] about 10 years ago, had w/u but everything was fine, was told that he had "vasovagal syndrome". Telemetry strip during the episode at the ED showed SR with complete heart block resulting in 10 seconds of asystole.     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  GERD (gastroesophageal reflux disease)    PSHx:   Arthroplasty, Knee  H/O coronary angioplasty  History of foot surgery    Allergies:  No Known Allergies    FAMILY HISTORY:  Family history of heart attack (Father)  Family history of prostate cancer (Father, Sibling)  father   Family history of colon cancer (Father, Mother)    REVIEW OF SYSTEMS:  Constitutional:     [x ] negative [ ] fevers [ ] chills [ ] weight loss [ ] weight gain  HEENT:                  [x ] negative [ ] dry eyes [ ] eye irritation [ ] postnasal drip [ ] nasal congestion  CV:                         [ x] negative  [ ] chest pain [ ] orthopnea [ ] palpitations [ ] murmur  Resp:                     [x ] negative [ ] cough [ ] shortness of breath [ ] dyspnea [ ] wheezing [ ] sputum [ ]hemoptysis  GI:                          [ ] negative [x ] nausea [x ] vomiting [ ] diarrhea [ ] constipation [ ] abd pain [ ] dysphagia   :                        [ x] negative [ ] dysuria [ ] nocturia [ ] hematuria [ ] increased urinary frequency  Musculoskeletal: [x ] negative [ ] back pain [ ] myalgias [ ] arthralgias [ ] fracture  Skin:                       [ x] negative [ ] rash [ ] itch  Neurological:        [ ] negative [ ] headache [x ] dizziness [ ] syncope [ ] weakness [ ] numbness  Psychiatric:           [ x] negative [ ] anxiety [ ] depression  Endocrine:            [ x] negative [ ] diabetes [ ] thyroid problem  Heme/Lymph:      [ x] negative [ ] anemia [ ] bleeding problem  Allergic/Immune: [ x] negative [ ] itchy eyes [ ] nasal discharge [ ] hives [ ] angioedema    [ x] All other systems negative  [ ] Unable to assess ROS due to    Physical Exam:  T(F): 98.5 (), Max: 98.5 ()  HR: 92 () (70 - 101)  BP: 159/85 () (125/75 - 165/88)  RR: 27 ()  SpO2: 96% ()  GENERAL: No acute distress, well-developed  HEAD:  Atraumatic, Normocephalic  ENT: EOMI, PERRLA, conjunctiva and sclera clear, Neck supple, No JVD, moist mucosa  CHEST/LUNG: Clear to auscultation bilaterally; No wheeze, equal breath sounds bilaterally   BACK: No spinal tenderness  HEART: Regular rate and rhythm; No murmurs, rubs, or gallops  ABDOMEN: Soft, Nontender, Nondistended; Bowel sounds present  EXTREMITIES:  No clubbing, cyanosis, or edema  PSYCH: Nl behavior, nl affect  NEUROLOGY: AAOx3, non-focal, cranial nerves intact  SKIN: Normal color, No rashes or lesions  LINES:    Cardiovascular Diagnostic Testing:    ECG: Personally reviewed:    Imaging:    CXR: Personally reviewed    Labs: Personally reviewed                        14.5   8.18  )-----------( 146      ( 21 May 2022 06:34 )             44.6         139  |  104  |  29<H>  ----------------------------<  126<H>  4.7   |  27  |  1.67<H>    Ca    8.4      21 May 2022 06:34  Phos  4.1       Mg     1.9         TPro  6.9  /  Alb  4.0  /  TBili  0.4  /  DBili  x   /  AST  18  /  ALT  24  /  AlkPhos  124<H>        Serum Pro-Brain Natriuretic Peptide: 120 pg/mL ( @ 23:55)         Patient seen and evaluated at bedside    Chief Complaint: dizziness, nausea, vomiting    HPI:  This is a 68 y/o M with PMH of HTN, Pre DM, Dyslipidemia, CAD s/p MI s/p PCI, CKD, Renal CA s/p right partial nephrectomy, Gout, GERD, and Obesity who presented with a syncopal episode. Patient states that he had nausea couple of hours after having his dinner last night, went to the bathroom where he had a lot of dry heaving & cough, and he passed out. the episode was witnessed by wife who stated that he was stiff & markedly pale with eyes rolled up for about 10 seconds then regained consciousness spontaneously, no seizure activity, called the ambulance, EMS reported another similar episode en rout to the hospital, a third episode was witnessed by ED staff while on telemonitoring at the ED. Currently he reports feeling absolutely fine, stated that he had similar episodes in the past starting when he was 5 years old, another during his hospital stay for ortho surgery at [place] about 10 years ago, had w/u but everything was fine, was told that he had "vasovagal syndrome". Telemetry strip during the episode at the ED showed SR with complete heart block resulting in 10 seconds of asystole.     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  GERD (gastroesophageal reflux disease)    PSHx:   Arthroplasty, Knee  H/O coronary angioplasty  History of foot surgery    Allergies:  No Known Allergies    FAMILY HISTORY:  Family history of heart attack (Father)  Family history of prostate cancer (Father, Sibling)  father   Family history of colon cancer (Father, Mother)    REVIEW OF SYSTEMS:  Constitutional:     [x ] negative [ ] fevers [ ] chills [ ] weight loss [ ] weight gain  HEENT:                  [x ] negative [ ] dry eyes [ ] eye irritation [ ] postnasal drip [ ] nasal congestion  CV:                         [ x] negative  [ ] chest pain [ ] orthopnea [ ] palpitations [ ] murmur  Resp:                     [x ] negative [ ] cough [ ] shortness of breath [ ] dyspnea [ ] wheezing [ ] sputum [ ]hemoptysis  GI:                          [ ] negative [x ] nausea [x ] vomiting [ ] diarrhea [ ] constipation [ ] abd pain [ ] dysphagia   :                        [ x] negative [ ] dysuria [ ] nocturia [ ] hematuria [ ] increased urinary frequency  Musculoskeletal: [x ] negative [ ] back pain [ ] myalgias [ ] arthralgias [ ] fracture  Skin:                       [ x] negative [ ] rash [ ] itch  Neurological:        [ ] negative [ ] headache [x ] dizziness [ ] syncope [ ] weakness [ ] numbness  Psychiatric:           [ x] negative [ ] anxiety [ ] depression  Endocrine:            [ x] negative [ ] diabetes [ ] thyroid problem  Heme/Lymph:      [ x] negative [ ] anemia [ ] bleeding problem  Allergic/Immune: [ x] negative [ ] itchy eyes [ ] nasal discharge [ ] hives [ ] angioedema    [ x] All other systems negative  [ ] Unable to assess ROS due to    Physical Exam:  T(F): 98.5 (-), Max: 98.5 ()  HR: 92 () (70 - 101)  BP: 159/85 () (125/75 - 165/88)  RR: 27 ()  SpO2: 96% ()  GENERAL: No acute distress, well-developed  HEAD:  Atraumatic, Normocephalic  ENT: EOMI, PERRLA, conjunctiva and sclera clear, Neck supple, No JVD, moist mucosa  CHEST/LUNG: Clear to auscultation bilaterally; No wheeze, equal breath sounds bilaterally   BACK: No spinal tenderness  HEART: Regular rate and rhythm; No murmurs, rubs, or gallops  ABDOMEN: Soft, Nontender, Nondistended; Bowel sounds present  EXTREMITIES:  No clubbing, cyanosis, or edema  PSYCH: Nl behavior, nl affect  NEUROLOGY: AAOx3, non-focal, cranial nerves intact  SKIN: Normal color, No rashes or lesions  LINES:    Cardiovascular Diagnostic Testing:  Per outpatient Dr. Betancourt's note, pt had nuc stress in 2022  He underwent a nuclear stress test which revealed no evidence of ischemia on EKG with good exercise tolerance and a fixed area of inferior defect compatible with inferior infarction    ECG: Personally reviewed:    Imaging:    CXR: Personally reviewed    Labs: Personally reviewed                        14.5     )-----------( 146      ( 21 May 2022 06:34 )             44.6         139  |  104  |  29<H>  ----------------------------<  126<H>  4.7   |  27  |  1.67<H>    Ca    8.4      21 May 2022 06:34  Phos  4.1       Mg     1.9         TPro  6.9  /  Alb  4.0  /  TBili  0.4  /  DBili  x   /  AST  18  /  ALT  24  /  AlkPhos  124<H>        Serum Pro-Brain Natriuretic Peptide: 120 pg/mL ( @ 23:55)

## 2022-05-21 NOTE — CONSULT NOTE ADULT - SUBJECTIVE AND OBJECTIVE BOX
CARDIOLOGY CONSULT NOTE    Patient is a 67y Male with a known history of :  Syncopal episodes [R55]    CAD (coronary artery disease) [I25.10]    HTN (hypertension) [I10]    Dyslipidemia [E78.5]    Stage 3 chronic kidney disease [N18.30]    Gout [M10.9]    Obesity [E66.9]    Need for prophylactic measure [Z29.9]    Prediabetes [R73.03]      HPI:  This is a 66 y/o M with PMH of HTN, Pre DM, Dyslipidemia, CAD s/p MI s/p PCI, CKD, Renal CA s/p right partial nephrectomy, Gout, GERD, and Obesity who presented with a syncopal episode. Patient states that he had nausea couple of hours after having his dinner last night, went to the bathroom where he had a lot of dry heaving & cough, and he passed out. the episode was witnessed by wife who stated that he was stiff & markedly pale with eyes rolled up for about 10 seconds then regained consciousness spontaneously, no seizure activity, called the ambulance, EMS reported another similar episode en rout to the hospital, a third episode was witnessed by ED staff while on telemonitoring at the ED. Currently he reports feeling absolutely fine, stated that he had similar episodes in the past starting when he was 5 years old, another during his hospital stay for ortho surgery at [facility] [place] about 10 years ago, had w/u but everything was fine, was told that he had "vasovagal syndrome". Telemetry strip during the episode at the ED showed SR with complete heart block resulting in 10 seconds of asystole.  (21 May 2022 02:33)      REVIEW OF SYSTEMS:    CONSTITUTIONAL: No fever, weight loss, or fatigue  EYES: No eye pain, visual disturbances, or discharge  ENMT:  No difficulty hearing, tinnitus, vertigo; No sinus or throat pain  NECK: No pain or stiffness  BREASTS: No pain, masses, or nipple discharge  RESPIRATORY: No cough, wheezing, chills or hemoptysis; No shortness of breath  CARDIOVASCULAR: No chest pain, palpitations, dizziness, or leg swelling  GASTROINTESTINAL: No abdominal or epigastric pain. No nausea, vomiting, or hematemesis; No diarrhea or constipation. No melena or hematochezia.  GENITOURINARY: No dysuria, frequency, hematuria, or incontinence  NEUROLOGICAL: No headaches, memory loss, loss of strength, numbness, or tremors  SKIN: No itching, burning, rashes, or lesions   LYMPH NODES: No enlarged glands  ENDOCRINE: No heat or cold intolerance; No hair loss  MUSCULOSKELETAL: No joint pain or swelling; No muscle, back, or extremity pain  PSYCHIATRIC: No depression, anxiety, mood swings, or difficulty sleeping  HEME/LYMPH: No easy bruising, or bleeding gums  ALLERGY AND IMMUNOLOGIC: No hives or eczema    MEDICATIONS  (STANDING):  allopurinol 200 milliGRAM(s) Oral daily  amLODIPine   Tablet 5 milliGRAM(s) Oral daily  aspirin enteric coated 81 milliGRAM(s) Oral daily  atorvastatin 20 milliGRAM(s) Oral at bedtime  chlorhexidine 2% Cloths 1 Application(s) Topical <User Schedule>  cholecalciferol 1000 Unit(s) Oral daily  heparin   Injectable 5000 Unit(s) SubCutaneous every 8 hours  losartan 50 milliGRAM(s) Oral daily  pantoprazole  Injectable 40 milliGRAM(s) IV Push two times a day    MEDICATIONS  (PRN):      ALLERGIES: No Known Allergies      FAMILY HISTORY:  Family history of heart attack (Father)    Family history of prostate cancer (Father, Sibling)  father     Family history of colon cancer (Father, Mother)        Social History:  Alochol:   Smoking:   Drug Use:   Marital Status:     I&O's Detail      PHYSICAL EXAMINATION:  -----------------------------  T(C): 36.6 (22 @ 08:26), Max: 36.7 (22 @ 02:34)  HR: 99 (22 @ 10:00) (70 - 99)  BP: 165/88 (22 @ 10:00) (125/75 - 165/88)  RR: 24 (22 @ 10:00) (14 - 26)  SpO2: 98% (22 @ 10:00) (93% - 98%)  Wt(kg): --    Height (cm): 180.3 ( 23:06)  Weight (kg): 117.9 ( 23:06)  BMI (kg/m2): 36.3 ( 23:06)  BSA (m2): 2.36 (05-20 @ 23:06)    Constitutional: well developed, normal appearance, well groomed, well nourished, no deformities and no acute distress.   Eyes: the conjunctiva exhibited no abnormalities and the eyelids demonstrated no xanthelasmas.   HEENT: normal oral mucosa, no oral pallor and no oral cyanosis.   Neck: normal jugular venous A waves present, normal jugular venous V waves present and no jugular venous skelton A waves.   Pulmonary: no respiratory distress, normal respiratory rhythm and effort, no accessory muscle use and lungs were clear to auscultation bilaterally.   Cardiovascular: heart rate and rhythm were normal, normal S1 and S2 and no murmur, gallop, rub, heave or thrill are present.   Musculoskeletal: the gait could not be assessed.   Extremities: no clubbing of the fingernails, no localized cyanosis, no petechial hemorrhages and no ischemic changes.   Skin: normal skin color and pigmentation, no rash, no venous stasis, no skin lesions, no skin ulcer and no xanthoma was observed.   Psychiatric: oriented to person, place, and time, the affect was normal, the mood was normal and not feeling anxious.     LABS:   --------      139  |  104  |  29<H>  ----------------------------<  126<H>  4.7   |  27  |  1.67<H>    Ca    8.4      21 May 2022 06:34  Phos  4.1       Mg     1.9         TPro  6.9  /  Alb  4.0  /  TBili  0.4  /  DBili  x   /  AST  18  /  ALT  24  /  AlkPhos  124<H>                           14.5   8.18  )-----------( 146      ( 21 May 2022 06:34 )             44.6        @ 23:55 BNP: 120 pg/mL              RADIOLOGY:  -----------------        ECG:

## 2022-05-21 NOTE — ED PROVIDER NOTE - CHPI ED SYMPTOMS NEG
no back pain/no chest pain/no cough/no dizziness/no fever/no shortness of breath/no chills/no diaphoresis

## 2022-05-21 NOTE — H&P ADULT - ASSESSMENT
68 y/o M with PMH of HTN, Pre DM, Dyslipidemia, CAD s/p MI s/p PCI, CKD, Renal CA s/p right partial nephrectomy, Gout, GERD, and Obesity with 3 syncopal episodes.

## 2022-05-22 ENCOUNTER — TRANSCRIPTION ENCOUNTER (OUTPATIENT)
Age: 67
End: 2022-05-22

## 2022-05-22 VITALS
HEART RATE: 97 BPM | OXYGEN SATURATION: 93 % | SYSTOLIC BLOOD PRESSURE: 163 MMHG | TEMPERATURE: 98 F | DIASTOLIC BLOOD PRESSURE: 82 MMHG | RESPIRATION RATE: 17 BRPM

## 2022-05-22 DIAGNOSIS — N17.9 ACUTE KIDNEY FAILURE, UNSPECIFIED: ICD-10-CM

## 2022-05-22 DIAGNOSIS — N18.2 CHRONIC KIDNEY DISEASE, STAGE 2 (MILD): ICD-10-CM

## 2022-05-22 DIAGNOSIS — I46.9 CARDIAC ARREST, CAUSE UNSPECIFIED: ICD-10-CM

## 2022-05-22 LAB
ALBUMIN SERPL ELPH-MCNC: 4.1 G/DL — SIGNIFICANT CHANGE UP (ref 3.3–5)
ALP SERPL-CCNC: 120 U/L — SIGNIFICANT CHANGE UP (ref 40–120)
ALT FLD-CCNC: 17 U/L — SIGNIFICANT CHANGE UP (ref 10–45)
ANION GAP SERPL CALC-SCNC: 10 MMOL/L — SIGNIFICANT CHANGE UP (ref 5–17)
APTT BLD: 33.8 SEC — SIGNIFICANT CHANGE UP (ref 27.5–35.5)
AST SERPL-CCNC: 17 U/L — SIGNIFICANT CHANGE UP (ref 10–40)
BASOPHILS # BLD AUTO: 0.03 K/UL — SIGNIFICANT CHANGE UP (ref 0–0.2)
BASOPHILS NFR BLD AUTO: 0.4 % — SIGNIFICANT CHANGE UP (ref 0–2)
BILIRUB SERPL-MCNC: 0.6 MG/DL — SIGNIFICANT CHANGE UP (ref 0.2–1.2)
BLD GP AB SCN SERPL QL: NEGATIVE — SIGNIFICANT CHANGE UP
BUN SERPL-MCNC: 18 MG/DL — SIGNIFICANT CHANGE UP (ref 7–23)
CALCIUM SERPL-MCNC: 9 MG/DL — SIGNIFICANT CHANGE UP (ref 8.4–10.5)
CHLORIDE SERPL-SCNC: 104 MMOL/L — SIGNIFICANT CHANGE UP (ref 96–108)
CO2 SERPL-SCNC: 25 MMOL/L — SIGNIFICANT CHANGE UP (ref 22–31)
CREAT SERPL-MCNC: 1.39 MG/DL — HIGH (ref 0.5–1.3)
EGFR: 55 ML/MIN/1.73M2 — LOW
EOSINOPHIL # BLD AUTO: 0.09 K/UL — SIGNIFICANT CHANGE UP (ref 0–0.5)
EOSINOPHIL NFR BLD AUTO: 1.3 % — SIGNIFICANT CHANGE UP (ref 0–6)
GLUCOSE SERPL-MCNC: 110 MG/DL — HIGH (ref 70–99)
HCT VFR BLD CALC: 45.5 % — SIGNIFICANT CHANGE UP (ref 39–50)
HGB BLD-MCNC: 15 G/DL — SIGNIFICANT CHANGE UP (ref 13–17)
HIV 1+2 AB+HIV1 P24 AG SERPL QL IA: SIGNIFICANT CHANGE UP
IMM GRANULOCYTES NFR BLD AUTO: 0.4 % — SIGNIFICANT CHANGE UP (ref 0–1.5)
INR BLD: 1.05 RATIO — SIGNIFICANT CHANGE UP (ref 0.88–1.16)
LYMPHOCYTES # BLD AUTO: 1.3 K/UL — SIGNIFICANT CHANGE UP (ref 1–3.3)
LYMPHOCYTES # BLD AUTO: 18.8 % — SIGNIFICANT CHANGE UP (ref 13–44)
MAGNESIUM SERPL-MCNC: 2 MG/DL — SIGNIFICANT CHANGE UP (ref 1.6–2.6)
MCHC RBC-ENTMCNC: 29.8 PG — SIGNIFICANT CHANGE UP (ref 27–34)
MCHC RBC-ENTMCNC: 33 GM/DL — SIGNIFICANT CHANGE UP (ref 32–36)
MCV RBC AUTO: 90.5 FL — SIGNIFICANT CHANGE UP (ref 80–100)
MONOCYTES # BLD AUTO: 0.48 K/UL — SIGNIFICANT CHANGE UP (ref 0–0.9)
MONOCYTES NFR BLD AUTO: 6.9 % — SIGNIFICANT CHANGE UP (ref 2–14)
NEUTROPHILS # BLD AUTO: 5 K/UL — SIGNIFICANT CHANGE UP (ref 1.8–7.4)
NEUTROPHILS NFR BLD AUTO: 72.2 % — SIGNIFICANT CHANGE UP (ref 43–77)
NRBC # BLD: 0 /100 WBCS — SIGNIFICANT CHANGE UP (ref 0–0)
PHOSPHATE SERPL-MCNC: 2.4 MG/DL — LOW (ref 2.5–4.5)
PLATELET # BLD AUTO: 150 K/UL — SIGNIFICANT CHANGE UP (ref 150–400)
POTASSIUM SERPL-MCNC: 3.7 MMOL/L — SIGNIFICANT CHANGE UP (ref 3.5–5.3)
POTASSIUM SERPL-SCNC: 3.7 MMOL/L — SIGNIFICANT CHANGE UP (ref 3.5–5.3)
PROT SERPL-MCNC: 6.2 G/DL — SIGNIFICANT CHANGE UP (ref 6–8.3)
PROTHROM AB SERPL-ACNC: 12.2 SEC — SIGNIFICANT CHANGE UP (ref 10.5–13.4)
RBC # BLD: 5.03 M/UL — SIGNIFICANT CHANGE UP (ref 4.2–5.8)
RBC # FLD: 13 % — SIGNIFICANT CHANGE UP (ref 10.3–14.5)
RH IG SCN BLD-IMP: POSITIVE — SIGNIFICANT CHANGE UP
SARS-COV-2 RNA SPEC QL NAA+PROBE: SIGNIFICANT CHANGE UP
SODIUM SERPL-SCNC: 139 MMOL/L — SIGNIFICANT CHANGE UP (ref 135–145)
WBC # BLD: 6.93 K/UL — SIGNIFICANT CHANGE UP (ref 3.8–10.5)
WBC # FLD AUTO: 6.93 K/UL — SIGNIFICANT CHANGE UP (ref 3.8–10.5)

## 2022-05-22 PROCEDURE — 93306 TTE W/DOPPLER COMPLETE: CPT

## 2022-05-22 PROCEDURE — 80048 BASIC METABOLIC PNL TOTAL CA: CPT

## 2022-05-22 PROCEDURE — 80053 COMPREHEN METABOLIC PANEL: CPT

## 2022-05-22 PROCEDURE — 33274 TCAT INSJ/RPL PERM LDLS PM: CPT

## 2022-05-22 PROCEDURE — 86850 RBC ANTIBODY SCREEN: CPT

## 2022-05-22 PROCEDURE — 96374 THER/PROPH/DIAG INJ IV PUSH: CPT

## 2022-05-22 PROCEDURE — 85027 COMPLETE CBC AUTOMATED: CPT

## 2022-05-22 PROCEDURE — 71045 X-RAY EXAM CHEST 1 VIEW: CPT | Mod: 26

## 2022-05-22 PROCEDURE — U0005: CPT

## 2022-05-22 PROCEDURE — 87389 HIV-1 AG W/HIV-1&-2 AB AG IA: CPT

## 2022-05-22 PROCEDURE — 93010 ELECTROCARDIOGRAM REPORT: CPT

## 2022-05-22 PROCEDURE — 86900 BLOOD TYPING SEROLOGIC ABO: CPT

## 2022-05-22 PROCEDURE — 82550 ASSAY OF CK (CPK): CPT

## 2022-05-22 PROCEDURE — 93005 ELECTROCARDIOGRAM TRACING: CPT

## 2022-05-22 PROCEDURE — 99233 SBSQ HOSP IP/OBS HIGH 50: CPT

## 2022-05-22 PROCEDURE — 87635 SARS-COV-2 COVID-19 AMP PRB: CPT

## 2022-05-22 PROCEDURE — 70450 CT HEAD/BRAIN W/O DYE: CPT | Mod: MA

## 2022-05-22 PROCEDURE — 85730 THROMBOPLASTIN TIME PARTIAL: CPT

## 2022-05-22 PROCEDURE — 99285 EMERGENCY DEPT VISIT HI MDM: CPT | Mod: 25

## 2022-05-22 PROCEDURE — 84484 ASSAY OF TROPONIN QUANT: CPT

## 2022-05-22 PROCEDURE — 84443 ASSAY THYROID STIM HORMONE: CPT

## 2022-05-22 PROCEDURE — C1894: CPT

## 2022-05-22 PROCEDURE — 36415 COLL VENOUS BLD VENIPUNCTURE: CPT

## 2022-05-22 PROCEDURE — C1786: CPT

## 2022-05-22 PROCEDURE — 99232 SBSQ HOSP IP/OBS MODERATE 35: CPT

## 2022-05-22 PROCEDURE — 83735 ASSAY OF MAGNESIUM: CPT

## 2022-05-22 PROCEDURE — 83880 ASSAY OF NATRIURETIC PEPTIDE: CPT

## 2022-05-22 PROCEDURE — 85610 PROTHROMBIN TIME: CPT

## 2022-05-22 PROCEDURE — C1769: CPT

## 2022-05-22 PROCEDURE — U0003: CPT

## 2022-05-22 PROCEDURE — 84100 ASSAY OF PHOSPHORUS: CPT

## 2022-05-22 PROCEDURE — 85025 COMPLETE CBC W/AUTO DIFF WBC: CPT

## 2022-05-22 PROCEDURE — 86901 BLOOD TYPING SEROLOGIC RH(D): CPT

## 2022-05-22 PROCEDURE — 82553 CREATINE MB FRACTION: CPT

## 2022-05-22 PROCEDURE — 86618 LYME DISEASE ANTIBODY: CPT

## 2022-05-22 PROCEDURE — 71045 X-RAY EXAM CHEST 1 VIEW: CPT

## 2022-05-22 RX ORDER — ATORVASTATIN CALCIUM 80 MG/1
20 TABLET, FILM COATED ORAL AT BEDTIME
Refills: 0 | Status: DISCONTINUED | OUTPATIENT
Start: 2022-05-22 | End: 2022-05-22

## 2022-05-22 RX ORDER — ONDANSETRON 8 MG/1
4 TABLET, FILM COATED ORAL ONCE
Refills: 0 | Status: DISCONTINUED | OUTPATIENT
Start: 2022-05-22 | End: 2022-05-22

## 2022-05-22 RX ORDER — ASPIRIN/CALCIUM CARB/MAGNESIUM 324 MG
81 TABLET ORAL DAILY
Refills: 0 | Status: DISCONTINUED | OUTPATIENT
Start: 2022-05-23 | End: 2022-05-22

## 2022-05-22 RX ORDER — POTASSIUM PHOSPHATE, MONOBASIC POTASSIUM PHOSPHATE, DIBASIC 236; 224 MG/ML; MG/ML
15 INJECTION, SOLUTION INTRAVENOUS ONCE
Refills: 0 | Status: COMPLETED | OUTPATIENT
Start: 2022-05-22 | End: 2022-05-22

## 2022-05-22 RX ORDER — AMLODIPINE BESYLATE 2.5 MG/1
5 TABLET ORAL EVERY 24 HOURS
Refills: 0 | Status: DISCONTINUED | OUTPATIENT
Start: 2022-05-22 | End: 2022-05-22

## 2022-05-22 RX ORDER — CHOLECALCIFEROL (VITAMIN D3) 125 MCG
1000 CAPSULE ORAL DAILY
Refills: 0 | Status: DISCONTINUED | OUTPATIENT
Start: 2022-05-23 | End: 2022-05-22

## 2022-05-22 RX ORDER — ALLOPURINOL 300 MG
300 TABLET ORAL DAILY
Refills: 0 | Status: DISCONTINUED | OUTPATIENT
Start: 2022-05-22 | End: 2022-05-22

## 2022-05-22 RX ORDER — LOSARTAN POTASSIUM 100 MG/1
50 TABLET, FILM COATED ORAL DAILY
Refills: 0 | Status: DISCONTINUED | OUTPATIENT
Start: 2022-05-22 | End: 2022-05-22

## 2022-05-22 RX ORDER — PANTOPRAZOLE SODIUM 20 MG/1
40 TABLET, DELAYED RELEASE ORAL
Refills: 0 | Status: DISCONTINUED | OUTPATIENT
Start: 2022-05-22 | End: 2022-05-22

## 2022-05-22 RX ORDER — HYDROMORPHONE HYDROCHLORIDE 2 MG/ML
0.25 INJECTION INTRAMUSCULAR; INTRAVENOUS; SUBCUTANEOUS
Refills: 0 | Status: DISCONTINUED | OUTPATIENT
Start: 2022-05-22 | End: 2022-05-22

## 2022-05-22 RX ADMIN — LOSARTAN POTASSIUM 50 MILLIGRAM(S): 100 TABLET, FILM COATED ORAL at 05:49

## 2022-05-22 RX ADMIN — PANTOPRAZOLE SODIUM 40 MILLIGRAM(S): 20 TABLET, DELAYED RELEASE ORAL at 05:49

## 2022-05-22 RX ADMIN — AMLODIPINE BESYLATE 5 MILLIGRAM(S): 2.5 TABLET ORAL at 16:03

## 2022-05-22 RX ADMIN — POTASSIUM PHOSPHATE, MONOBASIC POTASSIUM PHOSPHATE, DIBASIC 62.5 MILLIMOLE(S): 236; 224 INJECTION, SOLUTION INTRAVENOUS at 05:50

## 2022-05-22 NOTE — DISCHARGE NOTE PROVIDER - CARE PROVIDER_API CALL
Micheal Betancourt (MD)  Cardiovascular Disease; Internal Medicine; Interventional Cardiology  [address] [place] [address]  Phone: [phone]  Fax: [phone]  Follow Up Time: 1 week

## 2022-05-22 NOTE — DISCHARGE NOTE PROVIDER - NSDCFUSCHEDAPPT_GEN_ALL_CORE_FT
Micheal Betancourt Physician [facility]  CARDIOLOGY [address] [facility]   Scheduled Appointment: 06/29/2022

## 2022-05-22 NOTE — PRE-ANESTHESIA EVALUATION ADULT - NSANTHPMHFT_GEN_ALL_CORE
HTN, Pre DM, Dyslipidemia, CAD s/p MI s/p PCI, CKD, Renal CA s/p right partial nephrectomy, Gout, GERD, and Obesity who presented with a syncopal episode for PPM

## 2022-05-22 NOTE — DISCHARGE NOTE PROVIDER - NSDCMRMEDTOKEN_GEN_ALL_CORE_FT
allopurinol: 200 milligram(s) orally once a day  amLODIPine 5 mg oral tablet: orally 2 times a day  Aspir 81 oral delayed release tablet: 1 tab(s) orally once a day at night  fluvastatin 80 mg oral tablet, extended release: 1 tab(s) orally once a day at night  losartan 50 mg oral tablet: 1 tab(s) orally once a day  metoprolol tartrate 25 mg oral tablet: orally 2 tabs in Am 1 tab at night    pantoprazole 40 mg oral delayed release tablet: 1 tab(s) orally once a day AM  Vitamin D3 1000 intl units oral capsule: 1 cap(s) orally once a day AM  Zetia 10 mg oral tablet: 1 tab(s) orally once a day at night

## 2022-05-22 NOTE — PROGRESS NOTE ADULT - SUBJECTIVE AND OBJECTIVE BOX
Emory Newman is well known to me admiitted to [facility] with syncope and recurrent syncope in hospital with P waves with long pause Trnsferred to [facility] for pacemaker  CAD s/p Mi and remote PTCI of RCA hypertension prediabetes CRI creatinines 1.5-1.6  recent echo in my office with inferior wall hypokineisa overall LVEF normal  has had prior syncopal episodes situational  today Nause avomiting syncope but recurrent syncope in hospital with prolonged asystole (P wave with no copnduction)  MEDICATIONS:  MEDICATIONS  (STANDING):      PHYSICAL EXAM:  T(C): 36.9 (05-21-22 @ 20:00), Max: 36.9 (05-21-22 @ 16:30)  HR: 92 (05-21-22 @ 20:30) (70 - 101)  BP: 159/85 (05-21-22 @ 20:30) (125/75 - 165/88)  RR: 27 (05-21-22 @ 20:30) (14 - 28)  SpO2: 96% (05-21-22 @ 20:30) (93% - 98%)  Wt(kg): --  I&O's Summary    21 May 2022 07:01  -  21 May 2022 23:18  --------------------------------------------------------  IN: 0 mL / OUT: 1550 mL / NET: -1550 mL          Appearance: Normal	  HEENT:   Normal oral mucosa, PERRL, EOMI	  Cardiovascular: Normal S1 S2, No JVD, No murmurs ,  Respiratory: Lungs clear to auscultation, normal effort 	  Gastrointestinal:  Soft, Non-tender, + BS	  Skin: No rashes, No ecchymoses, No cyanosis, warm to touch  Musculoskeletal: Normal range of motion, normal strength  Psychiatry:  Mood & affect appropriate  Ext: No edema  Peripheral pulses palpable 2+ bilaterally      LABS:    CARDIAC MARKERS:  CARDIAC MARKERS ( 20 May 2022 23:55 )  x     / x     / 57 U/L / x     / 1.3 ng/mL                                14.5   8.18  )-----------( 146      ( 21 May 2022 06:34 )             44.6     05-21    139  |  104  |  29<H>  ----------------------------<  126<H>  4.7   |  27  |  1.67<H>    Ca    8.4      21 May 2022 06:34  Phos  4.1     05-21  Mg     1.9     05-21    TPro  6.9  /  Alb  4.0  /  TBili  0.4  /  DBili  x   /  AST  18  /  ALT  24  /  AlkPhos  124<H>  05-20    proBNP: Serum Pro-Brain Natriuretic Peptide: 120 pg/mL (05-20 @ 23:55)    Lipid Profile:   HgA1c:   TSH:           TELEMETRY: 	    ECG: ECG syosset prolonged episode of asystole 	  RADIOLOGY:           
Subjective: Patient seen and examined. No new events except as noted.   Feels well anxious remains NSR no further syncope or bradycardia asystole  for pacemaker today  MEDICATIONS:  MEDICATIONS  (STANDING):  allopurinol 300 milliGRAM(s) Oral daily  amLODIPine   Tablet 5 milliGRAM(s) Oral every 24 hours  atorvastatin 20 milliGRAM(s) Oral at bedtime  losartan 50 milliGRAM(s) Oral daily  pantoprazole    Tablet 40 milliGRAM(s) Oral before breakfast      PHYSICAL EXAM:  T(C): 36.7 (05-22-22 @ 04:51), Max: 36.9 (05-21-22 @ 16:30)  HR: 85 (05-22-22 @ 04:51) (79 - 101)  BP: 143/77 (05-22-22 @ 04:51) (143/77 - 170/84)  RR: 18 (05-22-22 @ 04:51) (15 - 28)  SpO2: 94% (05-22-22 @ 04:51) (93% - 98%)  Wt(kg): --  I&O's Summary    21 May 2022 07:01  -  22 May 2022 07:00  --------------------------------------------------------  IN: 0 mL / OUT: 1550 mL / NET: -1550 mL          Appearance: Normal overweight NAD	  HEENT:   Normal oral mucosa, PERRL, EOMI	  Cardiovascular: Normal S1 S2, No JVD, No murmurs ,  Respiratory: Lungs clear to auscultation, normal effort 	  Gastrointestinal:  Soft, Non-tender, + BS	  Skin: No rashes, No ecchymoses, No cyanosis, warm to touch  Musculoskeletal: Normal range of motion, normal strength  Psychiatry:  Mood & affect appropriate  Ext: No edema  Peripheral pulses palpable 2+ bilaterally      LABS:    CARDIAC MARKERS:  CARDIAC MARKERS ( 20 May 2022 23:55 )  x     / x     / 57 U/L / x     / 1.3 ng/mL                                15.0   6.93  )-----------( 150      ( 22 May 2022 03:22 )             45.5     05-22    139  |  104  |  18  ----------------------------<  110<H>  3.7   |  25  |  1.39<H>    Ca    9.0      22 May 2022 03:22  Phos  2.4     05-22  Mg     2.0     05-22    TPro  6.2  /  Alb  4.1  /  TBili  0.6  /  DBili  x   /  AST  17  /  ALT  17  /  AlkPhos  120  05-22    proBNP:   Lipid Profile:   HgA1c:   TSH:           TELEMETRY: 	    ECG:  NSR minor NSSTT changes	  [ ] Stress Test:    OTHER: 	          
Transfer note: Pt is a 67 year old M hx of HTN, preDM2, dyslipidemia, CAD s/p MI w/ PCI, CKD renal cancer s/p r. pt nephrectomy, gout, GERD, obesity who presented to [place] for syncopal event. Pt had nausea a couple of hours prior to dinner and went to the bathroom w/ cough/dry heaving and passed out. Wife witnessed this and said patient was pale w/ eyes rolled up for 10 seconds. Had another 2 episodes arriving to the hospital. He had tele strip w/ 10 seconds of asystole (?[facility] EKG does not show hear block). He was transferred to [facility] for pacemaker placement.     EKG NSR no acute ST elevations or pathologic Q waves, no major abnormalities noted on TTE    ICU Vital Signs Last 24 Hrs  T(C): 36.8 (21 May 2022 21:15), Max: 36.9 (21 May 2022 16:30)  T(F): 98.2 (21 May 2022 21:15), Max: 98.5 (21 May 2022 16:30)  HR: 94 (21 May 2022 21:15) (78 - 101)  BP: 170/84 (21 May 2022 21:15) (138/67 - 170/84)  BP(mean): 106 (21 May 2022 20:30) (87 - 121)  ABP: --  ABP(mean): --  RR: 20 (21 May 2022 21:15) (15 - 28)  SpO2: 94% (21 May 2022 21:15) (93% - 98%)      PHYSICAL EXAM:  GENERAL: NAD, well-developed  HEAD:  Atraumatic, Normocephalic  EYES: EOMI, PERRLA, conjunctiva and sclera clear  NECK: Supple, No JVD  CHEST/LUNG: Clear to auscultation bilaterally; No wheeze  HEART: Regular rate and rhythm; No murmurs, rubs, or gallops  ABDOMEN: Soft, Nontender, Nondistended; Bowel sounds present  EXTREMITIES:  2+ Peripheral Pulses, No clubbing, cyanosis, or edema  PSYCH: AAOx3  NEUROLOGY: non-focal  SKIN: No rashes or lesions    MEDICATIONS  (STANDING):  amLODIPine   Tablet 5 milliGRAM(s) Oral every 24 hours  atorvastatin 20 milliGRAM(s) Oral at bedtime  losartan 50 milliGRAM(s) Oral daily  pantoprazole    Tablet 40 milliGRAM(s) Oral before breakfast    MEDICATIONS  (PRN):    Patient History:    Past Medical, Past Surgical, and Family History:  PAST MEDICAL HISTORY:  CAD (coronary artery disease)     GERD (gastroesophageal reflux disease)     Gout     Hypercholesterolemia     Hypertension     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     PAST SURGICAL HISTORY:  Arthroplasty, Knee h/o left    H/O coronary angioplasty 1 stent 2009    History of foot surgery right.     FAMILY HISTORY:  Father  Still living? No  Family history of colon cancer, Age at diagnosis: Age Unknown  Family history of heart attack, Age at diagnosis: Age Unknown  Family history of prostate cancer, Age at diagnosis: Age Unknown    Mother  Still living? No  Family history of colon cancer, Age at diagnosis: Age Unknown    Sibling  Still living? Yes, Estimated age: Age Unknown  Family history of prostate cancer, Age at diagnosis: Age Unknown.    Social: denies tobacco use, illicit substance, or heavy alcohol use

## 2022-05-22 NOTE — PROGRESS NOTE ADULT - ASSESSMENT
68 y/o M with PMH of HTN, Pre DM, Dyslipidemia, CAD s/p MI s/p PCI, CKD, Renal CA s/p right partial nephrectomy, Gout, GERD, and Obesity with 3 syncopal episodes 2/2 asystole here for pacemaker placement. 
Omi with recurent syncope with documented asystolic prolonged episode  clinically and hemodynamically stable    Recommend  discussed with EP  proceed withpacemaker in A> ?letitia  will discusswith with Brianne type of peacemaker in AM
1. vasovagal syncope  2. P waves for 12 seconds no QRSsyncope no nause or vomiting  3. essential hypertension on amlodipine and cozaar  4. s/p remote PTCI MI  5. renal insufficiency- renal fucntion creatinine 1.3 today    Recimmend  proceed with PPM today  discharge home later today if OK with EP  followup with me in 1-2 weeks

## 2022-05-22 NOTE — DISCHARGE NOTE PROVIDER - HOSPITAL COURSE
68 y/o M with PMH of HTN, Pre DM, Dyslipidemia, CAD s/p MI s/p PCI, CKD, Renal CA s/p right partial nephrectomy, Gout, GERD, and Obesity with 3 syncopal episodes.     Problem/Plan - 1:  Syncopal episodes.   3 episodes in the setting of vomiting & dry heaving ML related to vagal overtone, all were witnessed, s/p transcutaneous PMKR, Metoprolol held    s/p  Micra Pacemaker implantation today      Problem/Plan - 2:  Prediabetes.   Started on consistent carbohydrate diet  Follow up outpatient with PCP     Problem/Plan - 3: CAD (coronary artery disease).   s/p PCI about 10 years ago following an MI  Continue low dose Aspirin & Atorvastatin      Problem/Plan - 4: HTN (hypertension).   Restart  Metoprolol   Continue Losartan & Amlodipine with hold parameters.     Problem/Plan - 5: Dyslipidemia.   Continue  fluvastatin and  Zitia      Problem/Plan - 6:  JOHN   Creatine 1.78--->1.38   Renally dose meds  & avoid nephrotoxins,        Problem/Plan - 7: Gout.   continue allopurinol QD.     Problem/Plan - 8:  Obesity.   Encourage healthy lifestyle.    Patient stable for discharge home

## 2022-05-22 NOTE — PROGRESS NOTE ADULT - PROBLEM SELECTOR PLAN 1
-pt w/ multiple episodes of passing out, asystole noted for 10 seconds on tele strip  -EKG without acute findings  -will need pacemaker keep pt NPO  -avoid AV dayan blocking agents, was ordered for amlodipine 5 mg QD this has minimal effect on AV dayan conduction, can c/w this for evening but clarify w/ EP to be safe  -repeat TTE as well, one at SY w/o acute findings   -check TSH

## 2022-05-22 NOTE — DISCHARGE NOTE NURSING/CASE MANAGEMENT/SOCIAL WORK - PATIENT PORTAL LINK FT
You can access the FollowMyHealth Patient Portal offered by [facility] by registering at the following website: [email or web address]. By joining [employer]’s FollowMyHealth portal, you will also be able to view your health information using other applications (apps) compatible with our system.

## 2022-05-22 NOTE — DISCHARGE NOTE PROVIDER - NSDCCPCAREPLAN_GEN_ALL_CORE_FT
PRINCIPAL DISCHARGE DIAGNOSIS  Diagnosis: Syncope  Assessment and Plan of Treatment: 3 episodes in the setting of vomiting & dry heaving ML related to vagal overtone, all were witnessed,   s/p transcutaneous PMKR, Metoprolol held    asystole noted for 10 seconds on tele strip  s/p  Micra Pacemaker implantation today         SECONDARY DISCHARGE DIAGNOSES  Diagnosis: CAD (coronary artery disease)  Assessment and Plan of Treatment: s/p PCI about 10 years ago following an MI  Continue low dose Aspirin   Continue fluvastatin and  Zitia    Diagnosis: Prediabetes  Assessment and Plan of Treatment: Started on consistent carbohydrate diet  Follow up outpatient with PCP    Diagnosis: HTN (hypertension)  Assessment and Plan of Treatment: Restart  Metoprolol   Continue Losartan & Amlodipine with hold parameters.    Diagnosis: Gout  Assessment and Plan of Treatment: continue Allopurinol daily    Diagnosis: JOHN (acute kidney injury)  Assessment and Plan of Treatment: Creatine 1.78--->1.38   Renally dose meds  & avoid nephrotoxins,       Diagnosis: Obesity  Assessment and Plan of Treatment: Encourage healthy lifestyle.

## 2022-05-23 ENCOUNTER — NON-APPOINTMENT (OUTPATIENT)
Age: 67
End: 2022-05-23

## 2022-05-23 LAB
B BURGDOR C6 AB SER-ACNC: NEGATIVE — SIGNIFICANT CHANGE UP
B BURGDOR IGG+IGM SER-ACNC: 0.02 INDEX — SIGNIFICANT CHANGE UP (ref 0.01–0.89)

## 2022-06-01 ENCOUNTER — NON-APPOINTMENT (OUTPATIENT)
Age: 67
End: 2022-06-01

## 2022-06-01 ENCOUNTER — APPOINTMENT (OUTPATIENT)
Dept: CARDIOLOGY | Facility: CLINIC | Age: 67
End: 2022-06-01
Payer: MEDICARE

## 2022-06-01 VITALS
HEART RATE: 71 BPM | DIASTOLIC BLOOD PRESSURE: 78 MMHG | SYSTOLIC BLOOD PRESSURE: 140 MMHG | WEIGHT: 254 LBS | BODY MASS INDEX: 35.56 KG/M2 | OXYGEN SATURATION: 95 % | RESPIRATION RATE: 17 BRPM | HEIGHT: 71 IN

## 2022-06-01 DIAGNOSIS — G47.30 SLEEP APNEA, UNSPECIFIED: ICD-10-CM

## 2022-06-01 PROCEDURE — 93000 ELECTROCARDIOGRAM COMPLETE: CPT

## 2022-06-01 PROCEDURE — 99214 OFFICE O/P EST MOD 30 MIN: CPT

## 2022-06-13 ENCOUNTER — APPOINTMENT (OUTPATIENT)
Dept: ELECTROPHYSIOLOGY | Facility: CLINIC | Age: 67
End: 2022-06-13
Payer: MEDICARE

## 2022-06-13 PROCEDURE — 99024 POSTOP FOLLOW-UP VISIT: CPT

## 2022-06-13 RX ORDER — METOPROLOL SUCCINATE 25 MG/1
25 TABLET, EXTENDED RELEASE ORAL
Qty: 360 | Refills: 0 | Status: DISCONTINUED | COMMUNITY
Start: 2020-11-11 | End: 2022-06-13

## 2022-06-13 RX ORDER — MELOXICAM 15 MG/1
15 TABLET ORAL
Qty: 20 | Refills: 0 | Status: DISCONTINUED | COMMUNITY
Start: 2022-01-03 | End: 2022-06-13

## 2022-06-13 RX ORDER — METHOCARBAMOL 750 MG/1
750 TABLET, FILM COATED ORAL
Qty: 20 | Refills: 0 | Status: DISCONTINUED | COMMUNITY
Start: 2021-12-10 | End: 2022-06-13

## 2022-07-19 ENCOUNTER — RX RENEWAL (OUTPATIENT)
Age: 67
End: 2022-07-19

## 2022-07-23 ENCOUNTER — NON-APPOINTMENT (OUTPATIENT)
Age: 67
End: 2022-07-23

## 2022-08-08 ENCOUNTER — APPOINTMENT (OUTPATIENT)
Dept: ELECTROPHYSIOLOGY | Facility: CLINIC | Age: 67
End: 2022-08-08

## 2022-08-08 PROCEDURE — 93279 PRGRMG DEV EVAL PM/LDLS PM: CPT

## 2022-08-31 ENCOUNTER — RX CHANGE (OUTPATIENT)
Age: 67
End: 2022-08-31

## 2022-09-01 ENCOUNTER — RX CHANGE (OUTPATIENT)
Age: 67
End: 2022-09-01

## 2022-09-14 ENCOUNTER — RX CHANGE (OUTPATIENT)
Age: 67
End: 2022-09-14

## 2022-09-20 ENCOUNTER — NON-APPOINTMENT (OUTPATIENT)
Age: 67
End: 2022-09-20

## 2022-09-23 ENCOUNTER — NON-APPOINTMENT (OUTPATIENT)
Age: 67
End: 2022-09-23

## 2022-09-26 ENCOUNTER — NON-APPOINTMENT (OUTPATIENT)
Age: 67
End: 2022-09-26

## 2022-10-05 ENCOUNTER — EMERGENCY (EMERGENCY)
Facility: HOSPITAL | Age: 67
LOS: 1 days | Discharge: HOME | End: 2022-10-05
Attending: EMERGENCY MEDICINE | Admitting: EMERGENCY MEDICINE
Payer: MEDICARE

## 2022-10-05 VITALS
DIASTOLIC BLOOD PRESSURE: 93 MMHG | HEART RATE: 80 BPM | HEIGHT: 71 IN | OXYGEN SATURATION: 95 % | WEIGHT: 238.98 LBS | TEMPERATURE: 98 F | SYSTOLIC BLOOD PRESSURE: 168 MMHG | RESPIRATION RATE: 18 BRPM

## 2022-10-05 DIAGNOSIS — Z98.61 CORONARY ANGIOPLASTY STATUS: Chronic | ICD-10-CM

## 2022-10-05 DIAGNOSIS — Z98.890 OTHER SPECIFIED POSTPROCEDURAL STATES: Chronic | ICD-10-CM

## 2022-10-05 PROCEDURE — 93010 ELECTROCARDIOGRAM REPORT: CPT

## 2022-10-05 PROCEDURE — 99285 EMERGENCY DEPT VISIT HI MDM: CPT

## 2022-10-05 NOTE — ED ADULT TRIAGE NOTE - BP NONINVASIVE DIASTOLIC (MM HG)
11/01/19 1536   Clinical Encounter Type   Visited With Patient   Holiness Encounters   Holiness Needs Prayer   Spiritual Requests During Visit / Hospitalization Sacrament of the Sick    Fr. Toi Oates provided scripture and support. 93

## 2022-10-06 VITALS
OXYGEN SATURATION: 97 % | TEMPERATURE: 98 F | DIASTOLIC BLOOD PRESSURE: 80 MMHG | HEART RATE: 79 BPM | RESPIRATION RATE: 18 BRPM | SYSTOLIC BLOOD PRESSURE: 153 MMHG

## 2022-10-06 LAB
ALBUMIN SERPL ELPH-MCNC: 3.8 G/DL — SIGNIFICANT CHANGE UP (ref 3.3–5)
ALP SERPL-CCNC: 117 U/L — SIGNIFICANT CHANGE UP (ref 30–120)
ALT FLD-CCNC: 41 U/L DA — SIGNIFICANT CHANGE UP (ref 10–60)
ANION GAP SERPL CALC-SCNC: 6 MMOL/L — SIGNIFICANT CHANGE UP (ref 5–17)
APTT BLD: 40 SEC — HIGH (ref 27.5–35.5)
AST SERPL-CCNC: 28 U/L — SIGNIFICANT CHANGE UP (ref 10–40)
BASOPHILS # BLD AUTO: 0.06 K/UL — SIGNIFICANT CHANGE UP (ref 0–0.2)
BASOPHILS NFR BLD AUTO: 0.6 % — SIGNIFICANT CHANGE UP (ref 0–2)
BILIRUB SERPL-MCNC: 0.5 MG/DL — SIGNIFICANT CHANGE UP (ref 0.2–1.2)
BUN SERPL-MCNC: 30 MG/DL — HIGH (ref 7–23)
CALCIUM SERPL-MCNC: 9.4 MG/DL — SIGNIFICANT CHANGE UP (ref 8.4–10.5)
CHLORIDE SERPL-SCNC: 102 MMOL/L — SIGNIFICANT CHANGE UP (ref 96–108)
CO2 SERPL-SCNC: 30 MMOL/L — SIGNIFICANT CHANGE UP (ref 22–31)
CREAT SERPL-MCNC: 1.63 MG/DL — HIGH (ref 0.5–1.3)
EGFR: 46 ML/MIN/1.73M2 — LOW
EOSINOPHIL # BLD AUTO: 0.13 K/UL — SIGNIFICANT CHANGE UP (ref 0–0.5)
EOSINOPHIL NFR BLD AUTO: 1.3 % — SIGNIFICANT CHANGE UP (ref 0–6)
GLUCOSE BLDC GLUCOMTR-MCNC: 110 MG/DL — HIGH (ref 70–99)
GLUCOSE SERPL-MCNC: 125 MG/DL — HIGH (ref 70–99)
HCT VFR BLD CALC: 47.1 % — SIGNIFICANT CHANGE UP (ref 39–50)
HGB BLD-MCNC: 16 G/DL — SIGNIFICANT CHANGE UP (ref 13–17)
IMM GRANULOCYTES NFR BLD AUTO: 0.7 % — SIGNIFICANT CHANGE UP (ref 0–0.9)
INR BLD: 1 RATIO — SIGNIFICANT CHANGE UP (ref 0.88–1.16)
LYMPHOCYTES # BLD AUTO: 1.69 K/UL — SIGNIFICANT CHANGE UP (ref 1–3.3)
LYMPHOCYTES # BLD AUTO: 16.6 % — SIGNIFICANT CHANGE UP (ref 13–44)
MCHC RBC-ENTMCNC: 30.4 PG — SIGNIFICANT CHANGE UP (ref 27–34)
MCHC RBC-ENTMCNC: 34 GM/DL — SIGNIFICANT CHANGE UP (ref 32–36)
MCV RBC AUTO: 89.5 FL — SIGNIFICANT CHANGE UP (ref 80–100)
MONOCYTES # BLD AUTO: 0.59 K/UL — SIGNIFICANT CHANGE UP (ref 0–0.9)
MONOCYTES NFR BLD AUTO: 5.8 % — SIGNIFICANT CHANGE UP (ref 2–14)
NEUTROPHILS # BLD AUTO: 7.64 K/UL — HIGH (ref 1.8–7.4)
NEUTROPHILS NFR BLD AUTO: 75 % — SIGNIFICANT CHANGE UP (ref 43–77)
NRBC # BLD: 0 /100 WBCS — SIGNIFICANT CHANGE UP (ref 0–0)
PLATELET # BLD AUTO: 168 K/UL — SIGNIFICANT CHANGE UP (ref 150–400)
POTASSIUM SERPL-MCNC: 4.1 MMOL/L — SIGNIFICANT CHANGE UP (ref 3.5–5.3)
POTASSIUM SERPL-SCNC: 4.1 MMOL/L — SIGNIFICANT CHANGE UP (ref 3.5–5.3)
PROT SERPL-MCNC: 7 G/DL — SIGNIFICANT CHANGE UP (ref 6–8.3)
PROTHROM AB SERPL-ACNC: 11.5 SEC — SIGNIFICANT CHANGE UP (ref 10.5–13.4)
RBC # BLD: 5.26 M/UL — SIGNIFICANT CHANGE UP (ref 4.2–5.8)
RBC # FLD: 13.2 % — SIGNIFICANT CHANGE UP (ref 10.3–14.5)
SARS-COV-2 RNA SPEC QL NAA+PROBE: SIGNIFICANT CHANGE UP
SODIUM SERPL-SCNC: 138 MMOL/L — SIGNIFICANT CHANGE UP (ref 135–145)
TROPONIN I, HIGH SENSITIVITY RESULT: 7.9 NG/L — SIGNIFICANT CHANGE UP
TROPONIN I, HIGH SENSITIVITY RESULT: 8.5 NG/L — SIGNIFICANT CHANGE UP
WBC # BLD: 10.18 K/UL — SIGNIFICANT CHANGE UP (ref 3.8–10.5)
WBC # FLD AUTO: 10.18 K/UL — SIGNIFICANT CHANGE UP (ref 3.8–10.5)

## 2022-10-06 PROCEDURE — 99285 EMERGENCY DEPT VISIT HI MDM: CPT | Mod: 25

## 2022-10-06 PROCEDURE — 84484 ASSAY OF TROPONIN QUANT: CPT

## 2022-10-06 PROCEDURE — 93005 ELECTROCARDIOGRAM TRACING: CPT

## 2022-10-06 PROCEDURE — 80053 COMPREHEN METABOLIC PANEL: CPT

## 2022-10-06 PROCEDURE — 70498 CT ANGIOGRAPHY NECK: CPT | Mod: 26,MA

## 2022-10-06 PROCEDURE — 70498 CT ANGIOGRAPHY NECK: CPT | Mod: MA

## 2022-10-06 PROCEDURE — 85025 COMPLETE CBC W/AUTO DIFF WBC: CPT

## 2022-10-06 PROCEDURE — 87635 SARS-COV-2 COVID-19 AMP PRB: CPT

## 2022-10-06 PROCEDURE — 70450 CT HEAD/BRAIN W/O DYE: CPT | Mod: 26,59,MA

## 2022-10-06 PROCEDURE — 70496 CT ANGIOGRAPHY HEAD: CPT | Mod: 26,MA

## 2022-10-06 PROCEDURE — 70450 CT HEAD/BRAIN W/O DYE: CPT | Mod: MA

## 2022-10-06 PROCEDURE — 96374 THER/PROPH/DIAG INJ IV PUSH: CPT | Mod: XU

## 2022-10-06 PROCEDURE — 36415 COLL VENOUS BLD VENIPUNCTURE: CPT

## 2022-10-06 PROCEDURE — 96361 HYDRATE IV INFUSION ADD-ON: CPT

## 2022-10-06 PROCEDURE — 85610 PROTHROMBIN TIME: CPT

## 2022-10-06 PROCEDURE — 82962 GLUCOSE BLOOD TEST: CPT

## 2022-10-06 PROCEDURE — 85730 THROMBOPLASTIN TIME PARTIAL: CPT

## 2022-10-06 PROCEDURE — 70496 CT ANGIOGRAPHY HEAD: CPT | Mod: MA

## 2022-10-06 RX ORDER — SODIUM CHLORIDE 9 MG/ML
1000 INJECTION INTRAMUSCULAR; INTRAVENOUS; SUBCUTANEOUS ONCE
Refills: 0 | Status: COMPLETED | OUTPATIENT
Start: 2022-10-06 | End: 2022-10-06

## 2022-10-06 RX ORDER — ONDANSETRON 8 MG/1
4 TABLET, FILM COATED ORAL ONCE
Refills: 0 | Status: COMPLETED | OUTPATIENT
Start: 2022-10-06 | End: 2022-10-06

## 2022-10-06 RX ADMIN — SODIUM CHLORIDE 1000 MILLILITER(S): 9 INJECTION INTRAMUSCULAR; INTRAVENOUS; SUBCUTANEOUS at 01:36

## 2022-10-06 RX ADMIN — ONDANSETRON 4 MILLIGRAM(S): 8 TABLET, FILM COATED ORAL at 00:36

## 2022-10-06 RX ADMIN — SODIUM CHLORIDE 1000 MILLILITER(S): 9 INJECTION INTRAMUSCULAR; INTRAVENOUS; SUBCUTANEOUS at 00:36

## 2022-10-06 NOTE — ED ADULT NURSE NOTE - OBJECTIVE STATEMENT
Pt p/w feeling dizzy and nausea for the past 3 hours, has been having scattered cough for the past few weeks, stroke code called, Pt verbalized dizziness has almost gone upon returning from ct scan, but vomited once.

## 2022-10-06 NOTE — ED PROVIDER NOTE - NS ED ROS FT
Constitutional: - Fever, - Chills, - Anorexia, - Fatigue, - Night sweats  Eyes: - Discharge, - Irritation, - Redness, - Visual changes, - Light sensitivity, - Pain  EARS: - Ear Pain, - Tinnitus, - Decreased hearing  NOSE: - Congestion, - Epistaxis  MOUTH/THROAT: - Vocal Changes, - Drooling, - Sore throat  NECK: - Lumps, - Stiffness, - Pain  CV: - Palpitations, - Chest Pain, - Edema, - Syncope  RESP:  - Cough, - Shortness of Breath, - Dyspnea on Exertion, - Trouble speaking, - Pleuritic pain - Wheezing  GI: - Diarrhea, - Constipation, - Bloody stools, + Nausea, + Vomiting, - Abdominal Pain  : - Dysuria, -Frequency, - Hematuria, - Hesitancy, - Incontinence, - Saddle Anesthesia, - Abnormal discharge  MSK: - Myalgias, - Arthralgias, - Weakness, - Deformities, - Injuries  SKIN: - Color change, - Rash, - Swelling, - Ecchymosis, - Abrasion, - laceration  NEURO: - Change in behavior, - Dec. Alertness, - Headache, + Dizziness, - Change in speech, - Weakness, - Seizure-like activity, +Difficulty ambulating

## 2022-10-06 NOTE — ED PROVIDER NOTE - OBJECTIVE STATEMENT
67-year-old male history of CAD GERD gout hypercholesterolemia hypertension pacemaker placement 4 months ago complaining about having lightheadedness around 3 hours ago with associated nausea vomiting.  States that he initially had a little bit of difficulty walking.  But now is resolved.  No fever no chills.  Admits to having a cough for 3 weeks has been COVID tested and has been negative.  Patient has had similar episodes before prior to having a pacemaker placed.

## 2022-10-06 NOTE — ED PROVIDER NOTE - PROGRESS NOTE DETAILS
ambulating in ER with steady gait, no complaints, NIHSS = 0 labs and imaging explained, will f/u with his cardiologist/neuro

## 2022-10-06 NOTE — ED PROVIDER NOTE - PATIENT PORTAL LINK FT
You can access the FollowMyHealth Patient Portal offered by [facility] by registering at the following website: [email or web address]. By joining [employer]’s FollowMyHealth portal, you will also be able to view your health information using other applications (apps) compatible with our system.

## 2022-10-06 NOTE — ED PROVIDER NOTE - NSFOLLOWUPINSTRUCTIONS_ED_ALL_ED_FT
1) Follow-up with your Primary Medical Doctor, cardiologist and Dr. Ramon. Call today / next business day for prompt follow-up.  2) Return to Emergency room for any worsening or persistent pain, weakness, fever, or any other concerning symptoms.  3) See attached instruction sheets for additional information, including information regarding signs and symptoms to look out for, reasons to seek immediate care and other important instructions.  4) Follow-up with any specialists as discussed / noted as well.      Lightheadedness is the feeling that you may faint, but you do not. Your heartbeat may be fast or feel like it flutters. Lightheadedness may occur when you take certain medicines, such as medicine to lower your blood pressure. Dehydration, low sodium, low blood sugar, an abnormal heart rhythm, and anxiety are other common causes.     DISCHARGE INSTRUCTIONS:    Return to the emergency department if:   •You have sudden chest pain.       •You have trouble breathing or shortness of breath.       •You have vision changes, are sweating, and have nausea while you are sitting or lying down.       •You feel flushed and your heart is fluttering.      •You faint.       Contact your healthcare provider if:   •You feel lightheaded often.       •Your heart beats faster or slower than usual.       •You have questions or concerns about your condition or care.      Follow up with your healthcare provider as directed: You may need more tests to help find the cause of your lightheadedness. The tests will help healthcare providers plan the best treatment for you. Write down your questions so you remember to ask them during your visits.     Self-care: Talk with your healthcare provider about these and other ways to manage your symptoms:  •Lie down when you feel lightheaded, your throat gets tight, or your vision changes. Raise your legs above the level of your heart.      •Stand up slowly. Sit on the side of the bed or couch for a few minutes before you stand up.       •Take slow, deep breaths when you feel lightheaded. This can help decrease the feeling that you might faint.       •Ask if you need to avoid hot baths and saunas. These may make your symptoms worse.       Watch for signs of low blood sugar: These include hunger, nervousness, sweating, and fast or fluttery heartbeats. Talk with your healthcare provider about ways to keep your blood sugar level steady.    Check your blood pressure often: You should do this especially if you take medicine to lower your blood pressure. Check your blood pressure when you are lying down and when you are standing. Ask how often to check during the day. Keep a record of your blood pressure numbers. Your healthcare provider may use the record to help plan your treatment.    Keep a record of your lightheadedness episodes: Include your symptoms and your activity before and after the episode. The record can help your healthcare provider find the cause of your lightheadedness and help you manage episodes.

## 2022-10-13 ENCOUNTER — RX RENEWAL (OUTPATIENT)
Age: 67
End: 2022-10-13

## 2022-10-20 ENCOUNTER — NON-APPOINTMENT (OUTPATIENT)
Age: 67
End: 2022-10-20

## 2022-10-20 ENCOUNTER — APPOINTMENT (OUTPATIENT)
Dept: DISASTER EMERGENCY | Facility: HOSPITAL | Age: 67
End: 2022-10-20

## 2022-10-20 ENCOUNTER — TRANSCRIPTION ENCOUNTER (OUTPATIENT)
Age: 67
End: 2022-10-20

## 2022-10-20 ENCOUNTER — OUTPATIENT (OUTPATIENT)
Dept: OUTPATIENT SERVICES | Facility: HOSPITAL | Age: 67
LOS: 1 days | End: 2022-10-20

## 2022-10-20 VITALS
SYSTOLIC BLOOD PRESSURE: 160 MMHG | TEMPERATURE: 98 F | HEIGHT: 71 IN | OXYGEN SATURATION: 98 % | DIASTOLIC BLOOD PRESSURE: 93 MMHG | WEIGHT: 248.02 LBS | HEART RATE: 117 BPM | RESPIRATION RATE: 17 BRPM

## 2022-10-20 VITALS
RESPIRATION RATE: 17 BRPM | TEMPERATURE: 98 F | HEART RATE: 102 BPM | DIASTOLIC BLOOD PRESSURE: 82 MMHG | OXYGEN SATURATION: 94 % | SYSTOLIC BLOOD PRESSURE: 131 MMHG

## 2022-10-20 DIAGNOSIS — U07.1 COVID-19: ICD-10-CM

## 2022-10-20 DIAGNOSIS — Z98.61 CORONARY ANGIOPLASTY STATUS: Chronic | ICD-10-CM

## 2022-10-20 DIAGNOSIS — Z98.890 OTHER SPECIFIED POSTPROCEDURAL STATES: Chronic | ICD-10-CM

## 2022-10-20 RX ORDER — BEBTELOVIMAB 87.5 MG/ML
175 INJECTION, SOLUTION INTRAVENOUS ONCE
Refills: 0 | Status: COMPLETED | OUTPATIENT
Start: 2022-10-20 | End: 2022-10-20

## 2022-10-20 RX ADMIN — BEBTELOVIMAB 175 MILLIGRAM(S): 87.5 INJECTION, SOLUTION INTRAVENOUS at 15:10

## 2022-10-20 NOTE — CHART NOTE - NSCHARTNOTEFT_GEN_A_CORE
CC: Monoclonal Antibody Infusion/COVID 19 Positive  67y Male with PMHx of CAD, Lt knee replacement and recent dx of COVID 19+ who presents today for elective Bebtelovimab. Patient has been screened and was deemed to be a candidate.    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    GERD (gastroesophageal reflux disease)    Arthroplasty, Knee    H/O coronary angioplasty    History of foot surgery    SysAdmin_VisitLink        Exam/findings:  T(C): 36.6 (10-20-22 @ 14:59), Max: 36.6 (10-20-22 @ 14:59)  HR: 117 (10-20-22 @ 14:59) (117 - 117)  BP: 160/93 (10-20-22 @ 14:59) (160/93 - 160/93)  RR: 17 (10-20-22 @ 14:59) (17 - 17)  SpO2: 98% (10-20-22 @ 14:59) (98% - 98%)    PE:   Appearance: NAD	  HEENT:  NC/AT  Cardiovascular:  No edema  Respiratory: no use of accessory muscles  Gastrointestinal:  non-distended   Skin: warm and dry  Neurologic: Non-focal  Extremities: Normal range of motion    ASSESSMENT:  Pt is COVID positive with mild to moderate symptoms who was referred for elective MAB (Bebtelovimab).    PLAN:  - MAB treatment explained to patient. I have reviewed the Bebtelovimab Emergency Use Authorization (EUA) and I have provided the patient or patient's caregiver with the following information:   1. FDA has authorized emergency use of Bebtelovimab to be administered for the treatment of mild to moderate COVID-19, which is not an FDA-approved biological product.   2. The patient or patient's caregiver has the option to accept or refuse administration of MAB.   3. The significant known and potential risks and benefits of Bebtelovimab and the extent to which such risks and benefits are unknown.  4. Information on available alternative treatments and risks and benefits of those alternatives.  - Patient verbalized understanding of plan and agrees to treatment. All questions answered.  - Consent for MAB obtained.   - 175mg of Bebtelovimab administered as a single intravenous injection over at least 30 seconds.   - Observe patient for one hour post medication administration and then if stable, discharge home with outpatient follow up as planned by PCP.      POST ASSESSMENT:   Patient completed MAB, and monitored x 1 hour post-infusion with no adverse reactions noted, remained hemodynamically stable.  - Patient tolerated infusion well; denies complaints of chest pain/SOB/dizziness/palpitations.   - VSS for discharge home.  - D/C instructions given/ fact sheet included.  - Patient was instructed to self-isolate and use infection control measures (e.g wear mask, isolate, social distance, avoid sharing personal items, clean and disinfect "high touch" surfaces, and frequent handwashing according to the CDC guidelines.   - The patient was informed on what symptoms to be aware of for the next couple of days, and if there are any issues to call the 24/7 clinical call center. Patient was instructed to follow up with primary care provider as needed.

## 2022-10-21 NOTE — PACU DISCHARGE NOTE - AIRWAY PATENCY:
Detailed clearance and ASA hold rec's noted in Dr. Regalado OV note.     Detailed note faxed to [phone].   Satisfactory

## 2022-10-28 ENCOUNTER — RX RENEWAL (OUTPATIENT)
Age: 67
End: 2022-10-28

## 2022-10-28 ENCOUNTER — LABORATORY RESULT (OUTPATIENT)
Age: 67
End: 2022-10-28

## 2022-10-31 ENCOUNTER — NON-APPOINTMENT (OUTPATIENT)
Age: 67
End: 2022-10-31

## 2022-10-31 ENCOUNTER — APPOINTMENT (OUTPATIENT)
Dept: INTERNAL MEDICINE | Facility: CLINIC | Age: 67
End: 2022-10-31

## 2022-10-31 VITALS — BODY MASS INDEX: 34.72 KG/M2 | WEIGHT: 248 LBS | HEIGHT: 71 IN

## 2022-10-31 VITALS — DIASTOLIC BLOOD PRESSURE: 80 MMHG | SYSTOLIC BLOOD PRESSURE: 120 MMHG

## 2022-10-31 DIAGNOSIS — I21.9 ACUTE MYOCARDIAL INFARCTION, UNSPECIFIED: ICD-10-CM

## 2022-10-31 LAB
ALBUMIN SERPL ELPH-MCNC: 4.3 G/DL
ALP BLD-CCNC: 134 U/L
ALT SERPL-CCNC: 39 U/L
ANION GAP SERPL CALC-SCNC: 12 MMOL/L
APPEARANCE: CLEAR
AST SERPL-CCNC: 21 U/L
BACTERIA: NEGATIVE
BASOPHILS # BLD AUTO: 0.03 K/UL
BASOPHILS NFR BLD AUTO: 0.5 %
BILIRUB SERPL-MCNC: 0.3 MG/DL
BILIRUBIN URINE: NEGATIVE
BLOOD URINE: NEGATIVE
BUN SERPL-MCNC: 24 MG/DL
CALCIUM SERPL-MCNC: 9.5 MG/DL
CHLORIDE SERPL-SCNC: 103 MMOL/L
CHOLEST SERPL-MCNC: 161 MG/DL
CO2 SERPL-SCNC: 24 MMOL/L
COLOR: NORMAL
CREAT SERPL-MCNC: 1.52 MG/DL
EGFR: 50 ML/MIN/1.73M2
EOSINOPHIL # BLD AUTO: 0.08 K/UL
EOSINOPHIL NFR BLD AUTO: 1.3 %
ESTIMATED AVERAGE GLUCOSE: 134 MG/DL
GLUCOSE QUALITATIVE U: NEGATIVE
GLUCOSE SERPL-MCNC: 126 MG/DL
HBA1C MFR BLD HPLC: 6.3 %
HCT VFR BLD CALC: 47.5 %
HDLC SERPL-MCNC: 37 MG/DL
HGB BLD-MCNC: 15.4 G/DL
HYALINE CASTS: 1 /LPF
IMM GRANULOCYTES NFR BLD AUTO: 1.4 %
KETONES URINE: NEGATIVE
LDLC SERPL CALC-MCNC: 85 MG/DL
LEUKOCYTE ESTERASE URINE: NEGATIVE
LYMPHOCYTES # BLD AUTO: 1.52 K/UL
LYMPHOCYTES NFR BLD AUTO: 23.9 %
MAN DIFF?: NORMAL
MCHC RBC-ENTMCNC: 29.8 PG
MCHC RBC-ENTMCNC: 32.4 GM/DL
MCV RBC AUTO: 91.9 FL
MICROSCOPIC-UA: NORMAL
MONOCYTES # BLD AUTO: 0.41 K/UL
MONOCYTES NFR BLD AUTO: 6.5 %
NEUTROPHILS # BLD AUTO: 4.22 K/UL
NEUTROPHILS NFR BLD AUTO: 66.4 %
NITRITE URINE: NEGATIVE
NONHDLC SERPL-MCNC: 124 MG/DL
PH URINE: 6
PLATELET # BLD AUTO: 207 K/UL
POTASSIUM SERPL-SCNC: 4.4 MMOL/L
PROT SERPL-MCNC: 6.3 G/DL
PROTEIN URINE: ABNORMAL
PSA SERPL-MCNC: 2.81 NG/ML
RBC # BLD: 5.17 M/UL
RBC # FLD: 12.9 %
RED BLOOD CELLS URINE: 2 /HPF
SODIUM SERPL-SCNC: 139 MMOL/L
SPECIFIC GRAVITY URINE: 1.02
SQUAMOUS EPITHELIAL CELLS: 0 /HPF
T3RU NFR SERPL: 1.1 TBI
T4 SERPL-MCNC: 9.1 UG/DL
TRIGL SERPL-MCNC: 195 MG/DL
TSH SERPL-ACNC: 2.62 UIU/ML
URATE SERPL-MCNC: 5.4 MG/DL
UROBILINOGEN URINE: NORMAL
WBC # FLD AUTO: 6.35 K/UL
WHITE BLOOD CELLS URINE: 0 /HPF

## 2022-10-31 PROCEDURE — G0439: CPT

## 2022-10-31 PROCEDURE — G0008: CPT

## 2022-10-31 PROCEDURE — G0402 INITIAL PREVENTIVE EXAM: CPT

## 2022-10-31 PROCEDURE — 90662 IIV NO PRSV INCREASED AG IM: CPT

## 2022-10-31 NOTE — ASSESSMENT
[FreeTextEntry1] : This is a patient with history of hypertension, hypercholesterolemia, hypernephroma, myocardial infarction. His vital signs are stable his physical examination is normal except for his weight. The patient was referred for CT of the abdomen and pelvis to followup on his carcinoma and was advised to proceed with an echo and stress test\par And a colonoscopy .  In addition the patient recently had a syncopal episode which required pacemaker

## 2022-10-31 NOTE — REVIEW OF SYSTEMS
[Fatigue] : fatigue [Hesitancy] : hesitancy [Nocturia] : nocturia [Fainting] : fainting [Negative] : Heme/Lymph [Abdominal Pain] : no abdominal pain [Nausea] : no nausea [Constipation] : no constipation

## 2022-10-31 NOTE — HISTORY OF PRESENT ILLNESS
[de-identified] : This is a 67  -year-old gentleman who is here for his annual examination. The patient has a history of hypertension, status post cardiac stent placement, status post myocardial infarction also status post right partial nephrectomy for carcinoma who is here today for his annual check.  He recently had a pacemaker inserted because of a syncopal episode

## 2022-10-31 NOTE — PHYSICAL EXAM
[Normal Sphincter Tone] : normal sphincter tone [No Mass] : no mass [Scrotum] : the scrotum was normal [Testes Tenderness] : no tenderness of the testes [Testes Mass (___cm)] : there were no testicular masses [Prostate Tenderness] : the prostate was not tender [No Prostate Nodules] : no prostate nodules [Normal] : affect was normal and insight and judgment were intact [Stool Occult Blood] : stool negative for occult blood

## 2022-10-31 NOTE — HEALTH RISK ASSESSMENT
[Yes] : Yes [2 - 3 times a week (3 pts)] : 2 - 3  times a week (3 points) [No falls in past year] : Patient reported no falls in the past year [0] : 2) Feeling down, depressed, or hopeless: Not at all (0) [PHQ-2 Negative - No further assessment needed] : PHQ-2 Negative - No further assessment needed [Fully functional (bathing, dressing, toileting, transferring, walking, feeding)] : Fully functional (bathing, dressing, toileting, transferring, walking, feeding) [Fully functional (using the telephone, shopping, preparing meals, housekeeping, doing laundry, using] : Fully functional and needs no help or supervision to perform IADLs (using the telephone, shopping, preparing meals, housekeeping, doing laundry, using transportation, managing medications and managing finances) [Audit-CScore] : 3 [[id number]] : 0

## 2022-11-07 ENCOUNTER — APPOINTMENT (OUTPATIENT)
Dept: ELECTROPHYSIOLOGY | Facility: CLINIC | Age: 67
End: 2022-11-07

## 2022-11-07 ENCOUNTER — NON-APPOINTMENT (OUTPATIENT)
Age: 67
End: 2022-11-07

## 2022-11-07 PROCEDURE — 93294 REM INTERROG EVL PM/LDLS PM: CPT

## 2022-11-07 PROCEDURE — 93296 REM INTERROG EVL PM/IDS: CPT

## 2022-11-11 ENCOUNTER — RX RENEWAL (OUTPATIENT)
Age: 67
End: 2022-11-11

## 2022-12-14 ENCOUNTER — APPOINTMENT (OUTPATIENT)
Dept: CARDIOLOGY | Facility: CLINIC | Age: 67
End: 2022-12-14

## 2022-12-14 ENCOUNTER — NON-APPOINTMENT (OUTPATIENT)
Age: 67
End: 2022-12-14

## 2022-12-14 VITALS
SYSTOLIC BLOOD PRESSURE: 130 MMHG | OXYGEN SATURATION: 96 % | HEART RATE: 82 BPM | WEIGHT: 252 LBS | DIASTOLIC BLOOD PRESSURE: 80 MMHG | BODY MASS INDEX: 35.15 KG/M2

## 2022-12-14 PROCEDURE — 93000 ELECTROCARDIOGRAM COMPLETE: CPT

## 2022-12-14 PROCEDURE — 99214 OFFICE O/P EST MOD 30 MIN: CPT

## 2022-12-29 ENCOUNTER — APPOINTMENT (OUTPATIENT)
Dept: PULMONOLOGY | Facility: CLINIC | Age: 67
End: 2022-12-29

## 2023-01-13 ENCOUNTER — RX RENEWAL (OUTPATIENT)
Age: 68
End: 2023-01-13

## 2023-01-18 NOTE — ED ADULT NURSE NOTE - BRADEN SCORE (IF 18 OR LESS ACTIVATE SKIN INJURY RISK INCREASED GUIDELINE), MLM
Provider Procedure Text (E): After obtaining clear surgical margins the defect was repaired by another provider. 23

## 2023-01-30 ENCOUNTER — NON-APPOINTMENT (OUTPATIENT)
Age: 68
End: 2023-01-30

## 2023-01-30 NOTE — PHYSICAL EXAM
[Well Developed] : well developed [Well Nourished] : well nourished [Normal Conjunctiva] : normal conjunctiva [No Carotid Bruit] : no carotid bruit [Normal S1, S2] : normal S1, S2 [No Murmur] : no murmur [Clear Lung Fields] : clear lung fields [No Respiratory Distress] : no respiratory distress  [Soft] : abdomen soft [Non Tender] : non-tender [No Edema] : no edema [de-identified] : Elevated BMI with a protuberant abdomen [de-identified] : Two-parent abdomen

## 2023-01-30 NOTE — DISCUSSION/SUMMARY
[EKG obtained to assist in diagnosis and management of assessed problem(s)] : EKG obtained to assist in diagnosis and management of assessed problem(s) [FreeTextEntry1] : ECG shows sinus rhythm with no's acute ST segment changes.  There are no changes compared with the previous ECG.\par 1.  His chest pain is not anginal and likely represents musculoskeletal pain with GERD.  I suggested that he add Pepcid nightly to his current PPI regimen for 2 weeks to see if this improves his epigastric discomfort.\par 2.  Continue present regimen of medications for his hypertension hyperlipidemia\par 3.  Follow-up with Dr. Betancourt as scheduled.

## 2023-01-30 NOTE — HISTORY OF PRESENT ILLNESS
[FreeTextEntry1] : Notes chest and epigastric pain. Not similar to prior anigna (sweaty, arm felt weird)\par Not worse with exercise. \par He recently played tennis and had no chest pains during the event.\par No recent episodes of syncope.\par \par Prior:\par Emory is well-known to me 67 years old with a history of prior MI and PTCI of the right coronary artery.  He has a history of hypertension and obesity.\par \par Over the years he has had episodes situational vasovagal syncope\par \par On Saturday morning he had episodes of profound nausea and vomiting and had syncope at least 2-3 times.  EMS was called and he was brought to [facility]\par \par At [facility] he had another episode of syncope without nausea and vomiting.  At this time his EKG revealed multiple P waves with no QRS for approximately 10 seconds which correlated with his syncope\par \par As result he was transferred to [facility] and underwent insertion of a leadless pacemaker at the apex of the right ventricle\par \par He was discharged the same day.\par \par He feels well without chest pain chest pressure shortness of breath dizziness or syncope.  He has no nausea or vomiting.  He is in excellent spirits\par \par EKG June 1, 2022 normal sinus rhythm heart rate 65 within normal limits

## 2023-02-01 NOTE — ASSESSMENT
[FreeTextEntry1] : Emory Newman who has a history of vasovagal syncope at several episodes of syncope but also had an episode of syncope which was not situational with multiple P waves with no conduction.  He is status post pacemaker.  Presently he is asymptomatic with stable vital signs no chest pain or shortness of breath\par \par Previous echocardiogram was unremarkable with some inferior wall hypokinesia but overall preserved LV function\par \par

## 2023-02-01 NOTE — PHYSICAL EXAM
[Well Developed] : well developed [Well Nourished] : well nourished [Normal Conjunctiva] : normal conjunctiva [No Carotid Bruit] : no carotid bruit [Normal S1, S2] : normal S1, S2 [No Murmur] : no murmur [Clear Lung Fields] : clear lung fields [No Respiratory Distress] : no respiratory distress  [Soft] : abdomen soft [Non Tender] : non-tender [No Edema] : no edema [de-identified] : Elevated BMI with a protuberant abdomen [de-identified] : Two-parent abdomen

## 2023-02-01 NOTE — HISTORY OF PRESENT ILLNESS
[FreeTextEntry1] : Emory is well-known to me 67 years old with a history of prior MI and PTCI of the right coronary artery.  He has a history of hypertension and obesity.\par \par Over the years he has had episodes situational vasovagal syncope\par \par On Saturday morning he had episodes of profound nausea and vomiting and had syncope at least 2-3 times.  EMS was called and he was brought to [facility]\par \par At [facility] he had another episode of syncope without nausea and vomiting.  At this time his EKG revealed multiple P waves with no QRS for approximately 10 seconds which correlated with his syncope\par \par As result he was transferred to [facility] and underwent insertion of a leadless pacemaker at the apex of the right ventricle\par \par He was discharged the same day.\par \par He feels well without chest pain chest pressure shortness of breath dizziness or syncope.  He has no nausea or vomiting.  He is in excellent spirits\par \par EKG June 1, 2022 normal sinus rhythm heart rate 65 within normal limits\par \par Patient has been stable since then without chest pain chest pressure shortness of breath.

## 2023-02-01 NOTE — DISCUSSION/SUMMARY
[FreeTextEntry1] : 1.  Patient will follow-up with  concerning the pacemaker\par 2.  Continue present regimen of medications for his hypertension hyperlipidemia\par 3.  Weight reduction and exercise discussed again\par 4.  Follow-up with me again in 3 months\par \par Overall he is clinically quite stable and I believe will benefit from the pacemaker as a standby in case she has another episode of profound bradycardia asystole \par \par   Addendum: February 1, 2023 patient is scheduled for colonoscopy.  The patient remains stable without chest pain chest pressure shortness of breath dizziness or syncope.  He remains on stable medication\par \par  I see no cardiac contraindication to the planned colonoscopy.  No further cardiac work-up is needed\par  Micheal Betancourt MD, FACC  February 1, 2020

## 2023-02-01 NOTE — REASON FOR VISIT
[FreeTextEntry1] : Emory Newman 67 years old recent episode of profound syncope and heart block status post pacemaker.  He is 1 week status post pacemaker

## 2023-02-06 ENCOUNTER — APPOINTMENT (OUTPATIENT)
Dept: ELECTROPHYSIOLOGY | Facility: CLINIC | Age: 68
End: 2023-02-06
Payer: MEDICARE

## 2023-02-06 ENCOUNTER — NON-APPOINTMENT (OUTPATIENT)
Age: 68
End: 2023-02-06

## 2023-02-06 PROCEDURE — 93294 REM INTERROG EVL PM/LDLS PM: CPT

## 2023-02-06 PROCEDURE — 93296 REM INTERROG EVL PM/IDS: CPT

## 2023-03-14 ENCOUNTER — NON-APPOINTMENT (OUTPATIENT)
Age: 68
End: 2023-03-14

## 2023-03-14 ENCOUNTER — APPOINTMENT (OUTPATIENT)
Dept: CARDIOLOGY | Facility: CLINIC | Age: 68
End: 2023-03-14
Payer: MEDICARE

## 2023-03-14 VITALS
SYSTOLIC BLOOD PRESSURE: 135 MMHG | HEART RATE: 70 BPM | OXYGEN SATURATION: 100 % | BODY MASS INDEX: 35 KG/M2 | HEIGHT: 71 IN | DIASTOLIC BLOOD PRESSURE: 75 MMHG | WEIGHT: 250 LBS

## 2023-03-14 DIAGNOSIS — K21.9 GASTRO-ESOPHAGEAL REFLUX DISEASE W/OUT ESOPHAGITIS: ICD-10-CM

## 2023-03-14 PROCEDURE — 93000 ELECTROCARDIOGRAM COMPLETE: CPT

## 2023-03-14 PROCEDURE — 99214 OFFICE O/P EST MOD 30 MIN: CPT

## 2023-03-14 NOTE — PHYSICAL EXAM
[Well Developed] : well developed [Well Nourished] : well nourished [Normal Conjunctiva] : normal conjunctiva [No Carotid Bruit] : no carotid bruit [Normal S1, S2] : normal S1, S2 [No Murmur] : no murmur [Clear Lung Fields] : clear lung fields [No Respiratory Distress] : no respiratory distress  [Soft] : abdomen soft [Non Tender] : non-tender [No Edema] : no edema [de-identified] : Two-parent abdomen [de-identified] : Elevated BMI with a protuberant abdomen

## 2023-03-14 NOTE — REASON FOR VISIT
[FreeTextEntry1] : Emory benedicto 67 years old called yesterday that he was experiencing some left arm pain and was concerned about  possible cardiac etiology.  He is now feeling fine and the symptoms have resolved

## 2023-03-14 NOTE — DISCUSSION/SUMMARY
[FreeTextEntry1] :   1.  I encouraged exercise weight reduction\par  2.  I reassured him that his cardiac status was stable and this left arm pain was noncardiac\par  3.  Continue present regimen of medications and follow-up with me again in 4 to 6-month [EKG obtained to assist in diagnosis and management of assessed problem(s)] : EKG obtained to assist in diagnosis and management of assessed problem(s)

## 2023-03-14 NOTE — ASSESSMENT
[FreeTextEntry1] : patient returns today because of atypical left arm pain.  This is noncardiac pain.  He is otherwise stable and on stable medications with stable blood pressure and normal EKG except for an incomplete right bundle branch block   1.  Elevated BMI- discussed the importance of weight reduction and exercise  2.  Essential hypertension well-controlled  3.  Atypical left arm pain noncardiac  4.  History of prior PTCI  5.  Status post pacemaker

## 2023-03-14 NOTE — HISTORY OF PRESENT ILLNESS
[FreeTextEntry1] : Notes chest and epigastric pain. Not similar to prior anigna (sweaty, arm felt weird)\par Not worse with exercise. \par He recently played tennis and had no chest pains during the event.\par No recent episodes of syncope.\par \par Prior:\par Emory is well-known to me 67 years old with a history of prior MI and PTCI of the right coronary artery.  He has a history of hypertension and obesity.\par \par Over the years he has had episodes situational vasovagal syncope\par \par On Saturday morning he had episodes of profound nausea and vomiting and had syncope at least 2-3 times.  EMS was called and he was brought to [facility]\par \par At [facility] he had another episode of syncope without nausea and vomiting.  At this time his EKG revealed multiple P waves with no QRS for approximately 10 seconds which correlated with his syncope\par \par As result he was transferred to [facility] and underwent insertion of a leadless pacemaker at the apex of the right ventricle\par \par He was discharged the same day.\par \par He feels well without chest pain chest pressure shortness of breath dizziness or syncope.  He has no nausea or vomiting.  He is in excellent spirits\[facility] \par  visit March 14, 2023: Patient overall feels well since the last visit.  He called that for the last day or 2 he has been experiencing some nonspecific left arm pain without any other associated symptoms of chest pressure shortness of breath or diaphoresis.  The symptoms now seem to have resolved.  He was concerned that this could be cardiac in etiology.  His medications remain stable.  He has had no dizziness or syncope.  He is status post a pacemaker for bradycardia and complete heart block.\par \par EKG March 14, 2023 normal sinus rhythm incomplete right bundle branch block no change

## 2023-04-24 ENCOUNTER — RX RENEWAL (OUTPATIENT)
Age: 68
End: 2023-04-24

## 2023-05-08 ENCOUNTER — LABORATORY RESULT (OUTPATIENT)
Age: 68
End: 2023-05-08

## 2023-05-08 ENCOUNTER — APPOINTMENT (OUTPATIENT)
Dept: ELECTROPHYSIOLOGY | Facility: CLINIC | Age: 68
End: 2023-05-08
Payer: MEDICARE

## 2023-05-08 ENCOUNTER — NON-APPOINTMENT (OUTPATIENT)
Age: 68
End: 2023-05-08

## 2023-05-08 PROCEDURE — 93296 REM INTERROG EVL PM/IDS: CPT

## 2023-05-08 PROCEDURE — 93294 REM INTERROG EVL PM/LDLS PM: CPT

## 2023-05-10 ENCOUNTER — RX RENEWAL (OUTPATIENT)
Age: 68
End: 2023-05-10

## 2023-05-11 ENCOUNTER — APPOINTMENT (OUTPATIENT)
Dept: INTERNAL MEDICINE | Facility: CLINIC | Age: 68
End: 2023-05-11
Payer: MEDICARE

## 2023-05-11 VITALS — SYSTOLIC BLOOD PRESSURE: 130 MMHG | DIASTOLIC BLOOD PRESSURE: 70 MMHG

## 2023-05-11 VITALS — BODY MASS INDEX: 35.28 KG/M2 | WEIGHT: 252 LBS | HEIGHT: 71 IN

## 2023-05-11 DIAGNOSIS — N52.9 MALE ERECTILE DYSFUNCTION, UNSPECIFIED: ICD-10-CM

## 2023-05-11 LAB
ALBUMIN SERPL ELPH-MCNC: 4.4 G/DL
ALP BLD-CCNC: 133 U/L
ALT SERPL-CCNC: 14 U/L
ANION GAP SERPL CALC-SCNC: 11 MMOL/L
APPEARANCE: CLEAR
AST SERPL-CCNC: 14 U/L
BACTERIA: NEGATIVE /HPF
BASOPHILS # BLD AUTO: 0.04 K/UL
BASOPHILS NFR BLD AUTO: 0.6 %
BILIRUB SERPL-MCNC: 0.5 MG/DL
BILIRUBIN URINE: NEGATIVE
BLOOD URINE: NEGATIVE
BUN SERPL-MCNC: 22 MG/DL
CALCIUM SERPL-MCNC: 9.7 MG/DL
CAST: 0 /LPF
CHLORIDE SERPL-SCNC: 104 MMOL/L
CHOLEST SERPL-MCNC: 166 MG/DL
CO2 SERPL-SCNC: 27 MMOL/L
COLOR: YELLOW
CREAT SERPL-MCNC: 1.49 MG/DL
EGFR: 50 ML/MIN/1.73M2
EOSINOPHIL # BLD AUTO: 0.11 K/UL
EOSINOPHIL NFR BLD AUTO: 1.6 %
EPITHELIAL CELLS: 0 /HPF
ESTIMATED AVERAGE GLUCOSE: 128 MG/DL
GLUCOSE QUALITATIVE U: NEGATIVE MG/DL
GLUCOSE SERPL-MCNC: 119 MG/DL
HBA1C MFR BLD HPLC: 6.1 %
HCT VFR BLD CALC: 47.4 %
HDLC SERPL-MCNC: 48 MG/DL
HGB BLD-MCNC: 15.2 G/DL
IMM GRANULOCYTES NFR BLD AUTO: 0.4 %
KETONES URINE: NEGATIVE MG/DL
LDLC SERPL CALC-MCNC: 78 MG/DL
LEUKOCYTE ESTERASE URINE: NEGATIVE
LYMPHOCYTES # BLD AUTO: 1.34 K/UL
LYMPHOCYTES NFR BLD AUTO: 19.8 %
MAN DIFF?: NORMAL
MCHC RBC-ENTMCNC: 30 PG
MCHC RBC-ENTMCNC: 32.1 GM/DL
MCV RBC AUTO: 93.5 FL
MICROSCOPIC-UA: NORMAL
MONOCYTES # BLD AUTO: 0.39 K/UL
MONOCYTES NFR BLD AUTO: 5.8 %
NEUTROPHILS # BLD AUTO: 4.87 K/UL
NEUTROPHILS NFR BLD AUTO: 71.8 %
NITRITE URINE: NEGATIVE
NONHDLC SERPL-MCNC: 117 MG/DL
PH URINE: 5.5
PLATELET # BLD AUTO: 181 K/UL
POTASSIUM SERPL-SCNC: 4.5 MMOL/L
PROT SERPL-MCNC: 6.3 G/DL
PROTEIN URINE: 100 MG/DL
RBC # BLD: 5.07 M/UL
RBC # FLD: 13.6 %
RED BLOOD CELLS URINE: 0 /HPF
SODIUM SERPL-SCNC: 142 MMOL/L
SPECIFIC GRAVITY URINE: 1.02
T3RU NFR SERPL: 1.1 TBI
T4 SERPL-MCNC: 7.5 UG/DL
TRIGL SERPL-MCNC: 195 MG/DL
TSH SERPL-ACNC: 1.55 UIU/ML
URATE SERPL-MCNC: 5.7 MG/DL
UROBILINOGEN URINE: 0.2 MG/DL
WBC # FLD AUTO: 6.78 K/UL
WHITE BLOOD CELLS URINE: 1 /HPF

## 2023-05-11 PROCEDURE — 99214 OFFICE O/P EST MOD 30 MIN: CPT | Mod: 25

## 2023-05-11 PROCEDURE — 36415 COLL VENOUS BLD VENIPUNCTURE: CPT

## 2023-05-11 RX ORDER — FLUVASTATIN 40 MG/1
40 CAPSULE ORAL
Qty: 180 | Refills: 3 | Status: DISCONTINUED | COMMUNITY
Start: 2022-07-29 | End: 2023-05-11

## 2023-05-11 NOTE — HEALTH RISK ASSESSMENT
[No falls in past year] : Patient reported no falls in the past year [0] : 2) Feeling down, depressed, or hopeless: Not at all (0) [PHQ-2 Negative - No further assessment needed] : PHQ-2 Negative - No further assessment needed [Never] : Never [[id number]] : 0

## 2023-05-11 NOTE — ASSESSMENT
[FreeTextEntry1] : Problems\par Hypertension\par ASHD\par Status post myocardial infarction\par Status post cardiac stent\par Pacemaker\par Malignant neoplasm of the right kidney\par Status post right nephrectomy\par Hypercholesterolemia\par Assessment\par The patient's blood pressure is well controlled with amlodipine 5 mg daily. ,  Losartan 50 mg daily and metoprolol 25 mg daily in addition the patient is due for a CAT scan to assess the status of his renal cell carcinoma.  Bloods were drawn to check his SGOT SGPT and alkaline phosphatase because of his statins and in addition his serum creatinine was drawn to assess his kidney function\par

## 2023-05-11 NOTE — HISTORY OF PRESENT ILLNESS
[de-identified] : This is a 68-year-old gentleman with a history of ASHD, status post myocardial infarction status post cardiac stent malignant neoplasm of the right kidney with nephrectomy, and a history of hypercholesterolemia

## 2023-05-12 ENCOUNTER — RX CHANGE (OUTPATIENT)
Age: 68
End: 2023-05-12

## 2023-05-12 LAB — PSA SERPL-MCNC: 2.19 NG/ML

## 2023-05-26 ENCOUNTER — RX RENEWAL (OUTPATIENT)
Age: 68
End: 2023-05-26

## 2023-05-26 RX ORDER — ZOLPIDEM TARTRATE 5 MG/1
5 TABLET ORAL
Qty: 30 | Refills: 0 | Status: ACTIVE | COMMUNITY
Start: 2023-03-14 | End: 1900-01-01

## 2023-07-17 ENCOUNTER — RX RENEWAL (OUTPATIENT)
Age: 68
End: 2023-07-17

## 2023-07-20 ENCOUNTER — RX RENEWAL (OUTPATIENT)
Age: 68
End: 2023-07-20

## 2023-07-20 RX ORDER — METOPROLOL SUCCINATE 25 MG/1
25 TABLET, EXTENDED RELEASE ORAL
Qty: 270 | Refills: 3 | Status: ACTIVE | COMMUNITY
Start: 2023-07-20 | End: 1900-01-01

## 2023-08-03 ENCOUNTER — RX RENEWAL (OUTPATIENT)
Age: 68
End: 2023-08-03

## 2023-08-07 ENCOUNTER — NON-APPOINTMENT (OUTPATIENT)
Age: 68
End: 2023-08-07

## 2023-08-07 ENCOUNTER — APPOINTMENT (OUTPATIENT)
Dept: ELECTROPHYSIOLOGY | Facility: CLINIC | Age: 68
End: 2023-08-07
Payer: MEDICARE

## 2023-08-07 DIAGNOSIS — R55 SYNCOPE AND COLLAPSE: ICD-10-CM

## 2023-08-07 PROCEDURE — 93279 PRGRMG DEV EVAL PM/LDLS PM: CPT

## 2023-09-12 ENCOUNTER — NON-APPOINTMENT (OUTPATIENT)
Age: 68
End: 2023-09-12

## 2023-09-12 ENCOUNTER — APPOINTMENT (OUTPATIENT)
Dept: CARDIOLOGY | Facility: CLINIC | Age: 68
End: 2023-09-12
Payer: MEDICARE

## 2023-09-12 VITALS — OXYGEN SATURATION: 95 % | HEART RATE: 63 BPM | DIASTOLIC BLOOD PRESSURE: 70 MMHG | SYSTOLIC BLOOD PRESSURE: 135 MMHG

## 2023-09-12 DIAGNOSIS — I45.9 CONDUCTION DISORDER, UNSPECIFIED: ICD-10-CM

## 2023-09-12 DIAGNOSIS — I25.2 OLD MYOCARDIAL INFARCTION: ICD-10-CM

## 2023-09-12 PROCEDURE — 99214 OFFICE O/P EST MOD 30 MIN: CPT

## 2023-09-12 PROCEDURE — 93000 ELECTROCARDIOGRAM COMPLETE: CPT

## 2023-09-12 RX ORDER — ASPIRIN 81 MG/1
81 TABLET ORAL
Refills: 0 | Status: ACTIVE | COMMUNITY

## 2023-09-19 ENCOUNTER — OUTPATIENT (OUTPATIENT)
Dept: OUTPATIENT SERVICES | Facility: HOSPITAL | Age: 68
LOS: 1 days | End: 2023-09-19
Payer: MEDICARE

## 2023-09-19 ENCOUNTER — APPOINTMENT (OUTPATIENT)
Dept: CT IMAGING | Facility: CLINIC | Age: 68
End: 2023-09-19
Payer: MEDICARE

## 2023-09-19 DIAGNOSIS — C64.1 MALIGNANT NEOPLASM OF RIGHT KIDNEY, EXCEPT RENAL PELVIS: ICD-10-CM

## 2023-09-19 DIAGNOSIS — Z98.890 OTHER SPECIFIED POSTPROCEDURAL STATES: Chronic | ICD-10-CM

## 2023-09-19 DIAGNOSIS — Z98.61 CORONARY ANGIOPLASTY STATUS: Chronic | ICD-10-CM

## 2023-09-19 PROCEDURE — 74160 CT ABDOMEN W/CONTRAST: CPT

## 2023-09-19 PROCEDURE — 74160 CT ABDOMEN W/CONTRAST: CPT | Mod: 26,MH

## 2023-09-20 ENCOUNTER — RX RENEWAL (OUTPATIENT)
Age: 68
End: 2023-09-20

## 2023-09-21 RX ORDER — TADALAFIL 20 MG/1
20 TABLET ORAL
Qty: 12 | Refills: 2 | Status: ACTIVE | COMMUNITY
Start: 2023-09-21 | End: 1900-01-01

## 2023-09-21 RX ORDER — SILDENAFIL 100 MG/1
100 TABLET, FILM COATED ORAL
Qty: 6 | Refills: 3 | Status: DISCONTINUED | COMMUNITY
Start: 2023-05-11 | End: 2023-09-21

## 2023-10-02 ENCOUNTER — APPOINTMENT (OUTPATIENT)
Dept: ORTHOPEDIC SURGERY | Facility: CLINIC | Age: 68
End: 2023-10-02

## 2023-10-10 ENCOUNTER — RX RENEWAL (OUTPATIENT)
Age: 68
End: 2023-10-10

## 2023-11-01 ENCOUNTER — RX RENEWAL (OUTPATIENT)
Age: 68
End: 2023-11-01

## 2023-11-07 ENCOUNTER — RX RENEWAL (OUTPATIENT)
Age: 68
End: 2023-11-07

## 2023-11-08 ENCOUNTER — APPOINTMENT (OUTPATIENT)
Dept: ELECTROPHYSIOLOGY | Facility: CLINIC | Age: 68
End: 2023-11-08
Payer: MEDICARE

## 2023-11-08 ENCOUNTER — NON-APPOINTMENT (OUTPATIENT)
Age: 68
End: 2023-11-08

## 2023-11-09 PROCEDURE — 93296 REM INTERROG EVL PM/IDS: CPT

## 2023-11-09 PROCEDURE — 93294 REM INTERROG EVL PM/LDLS PM: CPT

## 2023-12-15 ENCOUNTER — LABORATORY RESULT (OUTPATIENT)
Age: 68
End: 2023-12-15

## 2023-12-18 LAB
ALBUMIN SERPL ELPH-MCNC: 4.4 G/DL
ALP BLD-CCNC: 132 U/L
ALT SERPL-CCNC: 20 U/L
ANION GAP SERPL CALC-SCNC: 14 MMOL/L
APPEARANCE: CLEAR
AST SERPL-CCNC: 16 U/L
BACTERIA: NEGATIVE /HPF
BASOPHILS # BLD AUTO: 0.04 K/UL
BASOPHILS NFR BLD AUTO: 0.6 %
BILIRUB SERPL-MCNC: 0.4 MG/DL
BILIRUBIN URINE: NEGATIVE
BLOOD URINE: NEGATIVE
BUN SERPL-MCNC: 26 MG/DL
CALCIUM SERPL-MCNC: 9.1 MG/DL
CAST: 1 /LPF
CHLORIDE SERPL-SCNC: 104 MMOL/L
CHOLEST SERPL-MCNC: 170 MG/DL
CO2 SERPL-SCNC: 20 MMOL/L
COLOR: YELLOW
CREAT SERPL-MCNC: 1.74 MG/DL
EGFR: 42 ML/MIN/1.73M2
EOSINOPHIL # BLD AUTO: 0.13 K/UL
EOSINOPHIL NFR BLD AUTO: 1.8 %
EPITHELIAL CELLS: 0 /HPF
ESTIMATED AVERAGE GLUCOSE: 128 MG/DL
GLUCOSE QUALITATIVE U: NEGATIVE MG/DL
GLUCOSE SERPL-MCNC: 85 MG/DL
HBA1C MFR BLD HPLC: 6.1 %
HCT VFR BLD CALC: 46.1 %
HDLC SERPL-MCNC: 57 MG/DL
HGB BLD-MCNC: 15 G/DL
IMM GRANULOCYTES NFR BLD AUTO: 0.6 %
KETONES URINE: NEGATIVE MG/DL
LDLC SERPL CALC-MCNC: 90 MG/DL
LEUKOCYTE ESTERASE URINE: NEGATIVE
LYMPHOCYTES # BLD AUTO: 1.22 K/UL
LYMPHOCYTES NFR BLD AUTO: 17.3 %
MAN DIFF?: NORMAL
MCHC RBC-ENTMCNC: 29.8 PG
MCHC RBC-ENTMCNC: 32.5 GM/DL
MCV RBC AUTO: 91.7 FL
MICROSCOPIC-UA: NORMAL
MONOCYTES # BLD AUTO: 0.5 K/UL
MONOCYTES NFR BLD AUTO: 7.1 %
NEUTROPHILS # BLD AUTO: 5.11 K/UL
NEUTROPHILS NFR BLD AUTO: 72.6 %
NITRITE URINE: NEGATIVE
NONHDLC SERPL-MCNC: 112 MG/DL
PH URINE: 6
PLATELET # BLD AUTO: 174 K/UL
POTASSIUM SERPL-SCNC: 4.3 MMOL/L
PROT SERPL-MCNC: 6.3 G/DL
PROTEIN URINE: 100 MG/DL
PSA SERPL-MCNC: 2.46 NG/ML
RBC # BLD: 5.03 M/UL
RBC # FLD: 14.2 %
RED BLOOD CELLS URINE: 0 /HPF
SODIUM SERPL-SCNC: 138 MMOL/L
SPECIFIC GRAVITY URINE: 1.02
T3RU NFR SERPL: 1 TBI
T4 SERPL-MCNC: 7.4 UG/DL
TRIGL SERPL-MCNC: 129 MG/DL
TSH SERPL-ACNC: 1.69 UIU/ML
URATE SERPL-MCNC: 5.3 MG/DL
UROBILINOGEN URINE: 0.2 MG/DL
WBC # FLD AUTO: 7.04 K/UL
WHITE BLOOD CELLS URINE: 0 /HPF

## 2023-12-21 ENCOUNTER — RX RENEWAL (OUTPATIENT)
Age: 68
End: 2023-12-21

## 2023-12-24 ENCOUNTER — NON-APPOINTMENT (OUTPATIENT)
Age: 68
End: 2023-12-24

## 2024-01-08 ENCOUNTER — RX RENEWAL (OUTPATIENT)
Age: 69
End: 2024-01-08

## 2024-01-19 ENCOUNTER — LABORATORY RESULT (OUTPATIENT)
Age: 69
End: 2024-01-19

## 2024-01-19 ENCOUNTER — APPOINTMENT (OUTPATIENT)
Dept: INTERNAL MEDICINE | Facility: CLINIC | Age: 69
End: 2024-01-19
Payer: MEDICARE

## 2024-01-19 VITALS — WEIGHT: 258 LBS | BODY MASS INDEX: 36.12 KG/M2 | HEIGHT: 71 IN

## 2024-01-19 VITALS — DIASTOLIC BLOOD PRESSURE: 70 MMHG | SYSTOLIC BLOOD PRESSURE: 130 MMHG

## 2024-01-19 DIAGNOSIS — Z00.00 ENCOUNTER FOR GENERAL ADULT MEDICAL EXAMINATION W/OUT ABNORMAL FINDINGS: ICD-10-CM

## 2024-01-19 DIAGNOSIS — C64.1 MALIGNANT NEOPLASM OF RIGHT KIDNEY, EXCEPT RENAL PELVIS: ICD-10-CM

## 2024-01-19 DIAGNOSIS — E66.9 OBESITY, UNSPECIFIED: ICD-10-CM

## 2024-01-19 PROCEDURE — G0439: CPT

## 2024-01-19 PROCEDURE — 36415 COLL VENOUS BLD VENIPUNCTURE: CPT

## 2024-01-19 PROCEDURE — 93000 ELECTROCARDIOGRAM COMPLETE: CPT

## 2024-01-19 RX ORDER — SEMAGLUTIDE 0.25 MG/.5ML
0.25 INJECTION, SOLUTION SUBCUTANEOUS
Qty: 1 | Refills: 3 | Status: ACTIVE | COMMUNITY
Start: 2024-01-19 | End: 1900-01-01

## 2024-01-19 NOTE — HISTORY OF PRESENT ILLNESS
[de-identified] : This is a 68 -year-old gentleman who is here for his annual examination. The patient has a history of hypertension, status post cardiac stent placement, status post myocardial infarction also status post right partial nephrectomy for carcinoma who is here today for his annual well examination

## 2024-01-19 NOTE — ASSESSMENT
[FreeTextEntry1] : Assessment Encounter for preventive health examination (V70.0) (Z00.00) S/P coronary artery stent placement (V45.82) (Z95.5) Myocardial infarction (410.90) (I21.9) Benign essential hypertension (401.1) (I10) Chronic renal insufficiency (585.9) (N18.9) Malignant neoplasm of right kidney (189.0) (C64.1) This is a patient with history of hypertension, hypercholesterolemia, hypernephroma, myocardial infarction. His vital signs are stable his physical examination is normal except for his weight I started the patient on Wegovy 0.25 mg weekly and advised to follow-up.  I also explained to the patient the risks of Wegovy being nausea abdominal pain and intestinal obstruction

## 2024-01-19 NOTE — HEALTH RISK ASSESSMENT
[Yes] : Yes [2 - 3 times a week (3 pts)] : 2 - 3  times a week (3 points) [No falls in past year] : Patient reported no falls in the past year [0] : 2) Feeling down, depressed, or hopeless: Not at all (0) [PHQ-2 Negative - No further assessment needed] : PHQ-2 Negative - No further assessment needed [Fully functional (bathing, dressing, toileting, transferring, walking, feeding)] : Fully functional (bathing, dressing, toileting, transferring, walking, feeding) [Fully functional (using the telephone, shopping, preparing meals, housekeeping, doing laundry, using] : Fully functional and needs no help or supervision to perform IADLs (using the telephone, shopping, preparing meals, housekeeping, doing laundry, using transportation, managing medications and managing finances) [Never] : Never [Audit-CScore] : 3 [[id number]] : 0

## 2024-01-22 LAB
ALBUMIN SERPL ELPH-MCNC: 4.5 G/DL
ALP BLD-CCNC: 131 U/L
ALT SERPL-CCNC: 18 U/L
ANION GAP SERPL CALC-SCNC: 14 MMOL/L
APPEARANCE: CLEAR
AST SERPL-CCNC: 20 U/L
BACTERIA: NEGATIVE /HPF
BASOPHILS # BLD AUTO: 0.06 K/UL
BASOPHILS NFR BLD AUTO: 1 %
BILIRUB SERPL-MCNC: 0.4 MG/DL
BILIRUBIN URINE: NEGATIVE
BLOOD URINE: NEGATIVE
BUN SERPL-MCNC: 29 MG/DL
CALCIUM SERPL-MCNC: 9.3 MG/DL
CAST: 0 /LPF
CHLORIDE SERPL-SCNC: 106 MMOL/L
CHOLEST SERPL-MCNC: 170 MG/DL
CO2 SERPL-SCNC: 22 MMOL/L
COLOR: YELLOW
CREAT SERPL-MCNC: 1.48 MG/DL
EGFR: 51 ML/MIN/1.73M2
EOSINOPHIL # BLD AUTO: 0.15 K/UL
EOSINOPHIL NFR BLD AUTO: 2.5 %
EPITHELIAL CELLS: 0 /HPF
ESTIMATED AVERAGE GLUCOSE: 128 MG/DL
FOLATE SERPL-MCNC: 7.6 NG/ML
GLUCOSE QUALITATIVE U: NEGATIVE MG/DL
GLUCOSE SERPL-MCNC: 114 MG/DL
HBA1C MFR BLD HPLC: 6.1 %
HCT VFR BLD CALC: 48.2 %
HDLC SERPL-MCNC: 51 MG/DL
HGB BLD-MCNC: 15.6 G/DL
IMM GRANULOCYTES NFR BLD AUTO: 0.5 %
KETONES URINE: NEGATIVE MG/DL
LDLC SERPL CALC-MCNC: 91 MG/DL
LEUKOCYTE ESTERASE URINE: NEGATIVE
LYMPHOCYTES # BLD AUTO: 1.38 K/UL
LYMPHOCYTES NFR BLD AUTO: 22.8 %
MAN DIFF?: NORMAL
MCHC RBC-ENTMCNC: 30 PG
MCHC RBC-ENTMCNC: 32.4 GM/DL
MCV RBC AUTO: 92.7 FL
MICROSCOPIC-UA: NORMAL
MONOCYTES # BLD AUTO: 0.41 K/UL
MONOCYTES NFR BLD AUTO: 6.8 %
NEUTROPHILS # BLD AUTO: 4.02 K/UL
NEUTROPHILS NFR BLD AUTO: 66.4 %
NITRITE URINE: NEGATIVE
NONHDLC SERPL-MCNC: 119 MG/DL
PH URINE: 6
PLATELET # BLD AUTO: 165 K/UL
POTASSIUM SERPL-SCNC: 4.8 MMOL/L
PROT SERPL-MCNC: 6.5 G/DL
PROTEIN URINE: 300 MG/DL
PSA SERPL-MCNC: 2.8 NG/ML
RBC # BLD: 5.2 M/UL
RBC # FLD: 13.9 %
RED BLOOD CELLS URINE: 2 /HPF
SODIUM SERPL-SCNC: 141 MMOL/L
SPECIFIC GRAVITY URINE: 1.02
T3 SERPL-MCNC: 128 NG/DL
T3RU NFR SERPL: 1.1 TBI
TRIGL SERPL-MCNC: 165 MG/DL
TSH SERPL-ACNC: 1.49 UIU/ML
URATE SERPL-MCNC: 5.5 MG/DL
UROBILINOGEN URINE: 0.2 MG/DL
VIT B12 SERPL-MCNC: 381 PG/ML
WBC # FLD AUTO: 6.05 K/UL
WHITE BLOOD CELLS URINE: 1 /HPF

## 2024-02-07 ENCOUNTER — NON-APPOINTMENT (OUTPATIENT)
Age: 69
End: 2024-02-07

## 2024-02-07 ENCOUNTER — APPOINTMENT (OUTPATIENT)
Dept: ELECTROPHYSIOLOGY | Facility: CLINIC | Age: 69
End: 2024-02-07
Payer: MEDICARE

## 2024-02-07 PROCEDURE — 93296 REM INTERROG EVL PM/IDS: CPT

## 2024-02-07 PROCEDURE — 93294 REM INTERROG EVL PM/LDLS PM: CPT

## 2024-02-13 ENCOUNTER — APPOINTMENT (OUTPATIENT)
Dept: CARDIOLOGY | Facility: CLINIC | Age: 69
End: 2024-02-13
Payer: MEDICARE

## 2024-02-13 DIAGNOSIS — Z95.0 PRESENCE OF CARDIAC PACEMAKER: ICD-10-CM

## 2024-02-13 DIAGNOSIS — M10.9 GOUT, UNSPECIFIED: ICD-10-CM

## 2024-02-13 PROCEDURE — 99214 OFFICE O/P EST MOD 30 MIN: CPT

## 2024-02-13 PROCEDURE — G2211 COMPLEX E/M VISIT ADD ON: CPT

## 2024-02-13 NOTE — DISCUSSION/SUMMARY
[FreeTextEntry1] : 1.  Patient needs better with weight reduction and a better diet.  Wegovy is a good idea but it needs to be  to a good sensible diet.  Hopefully if diabetes is part of his diagnosis of prediabetes the Wegovy may be approved by insurance  2.  Stop fluvastatin and start Crestor 40 mg for better lowering of LDL to goal less than 70  I discussed all this in detail with the patient and have sent a prescription to the pharmacy

## 2024-02-13 NOTE — REASON FOR VISIT
[FreeTextEntry1] : Emory Newman had a telemedicine visit on February 14, 2024.  The patient was at his home and I was at my house at [address] [place] [address].  The patient was agreeable to the telemedicine visit.  The patient is followed for chronic ischemic heart disease, status post remote PTCI of the right coronary, history of hypertension, elevated BMI and hyperlipidemia. He remains asymptomatic from a cardiac point of view without chest pain chest pressure shortness of breath edema orthopnea PND.  He remains on stable medication including fluvastatin 80 and Zetia 10  Recent blood test 7 hemoglobin A1c of 6.1 and an LDL of 92 still not at goal.  His triglycerides are elevated. He was prescribed Wegovy for weight loss and for prediabetes and for its protection from a cardiac point of view.  Insurance did not approve it and it would cost him over $1700 a month.  He remains on stable medication which include: Allopurinol 100 amlodipine 5 mg twice a day ezetimibe 10 mg fluvastatin 80 mg losartan 50 mg once a day metoprolol ER 25 pantoprazole 40 tadalafil   Blood pressure has been stable with me in the past and recently with Dr. Graf at 130/80  Patient overall is quite stable from a cardiac point of view without angina or CHF.  His hemoglobin A1c is 6.1 and his LDL is 92 with a prior history of a cardiac event.  It would be ideal for his LDL to be closer to 70  I have recommended that he stop fluvastatin continue the ezetimibe and start Crestor at 40 mg a day.  Prescription was sent

## 2024-03-09 ENCOUNTER — LABORATORY RESULT (OUTPATIENT)
Age: 69
End: 2024-03-09

## 2024-03-11 LAB
ALBUMIN SERPL ELPH-MCNC: 4.5 G/DL
ALP BLD-CCNC: 124 U/L
ALT SERPL-CCNC: 13 U/L
ANION GAP SERPL CALC-SCNC: 12 MMOL/L
APPEARANCE: CLEAR
AST SERPL-CCNC: 12 U/L
BACTERIA: NEGATIVE /HPF
BASOPHILS # BLD AUTO: 0.03 K/UL
BASOPHILS NFR BLD AUTO: 0.5 %
BILIRUB SERPL-MCNC: 0.5 MG/DL
BILIRUBIN URINE: NEGATIVE
BLOOD URINE: NEGATIVE
BUN SERPL-MCNC: 34 MG/DL
CALCIUM SERPL-MCNC: 9.5 MG/DL
CAST: 1 /LPF
CHLORIDE SERPL-SCNC: 103 MMOL/L
CHOLEST SERPL-MCNC: 145 MG/DL
CO2 SERPL-SCNC: 24 MMOL/L
COLOR: YELLOW
CREAT SERPL-MCNC: 1.6 MG/DL
EGFR: 46 ML/MIN/1.73M2
EOSINOPHIL # BLD AUTO: 0.1 K/UL
EOSINOPHIL NFR BLD AUTO: 1.6 %
EPITHELIAL CELLS: 0 /HPF
ESTIMATED AVERAGE GLUCOSE: 123 MG/DL
FOLATE SERPL-MCNC: 8.7 NG/ML
GLUCOSE QUALITATIVE U: NEGATIVE MG/DL
GLUCOSE SERPL-MCNC: 117 MG/DL
HBA1C MFR BLD HPLC: 5.9 %
HCT VFR BLD CALC: 47.9 %
HDLC SERPL-MCNC: 48 MG/DL
HGB BLD-MCNC: 15.4 G/DL
IMM GRANULOCYTES NFR BLD AUTO: 0.5 %
KETONES URINE: NEGATIVE MG/DL
LDLC SERPL CALC-MCNC: 81 MG/DL
LEUKOCYTE ESTERASE URINE: NEGATIVE
LYMPHOCYTES # BLD AUTO: 1.28 K/UL
LYMPHOCYTES NFR BLD AUTO: 20.4 %
MAN DIFF?: NORMAL
MCHC RBC-ENTMCNC: 29.6 PG
MCHC RBC-ENTMCNC: 32.2 GM/DL
MCV RBC AUTO: 92.1 FL
MICROSCOPIC-UA: NORMAL
MONOCYTES # BLD AUTO: 0.44 K/UL
MONOCYTES NFR BLD AUTO: 7 %
NEUTROPHILS # BLD AUTO: 4.39 K/UL
NEUTROPHILS NFR BLD AUTO: 70 %
NITRITE URINE: NEGATIVE
NONHDLC SERPL-MCNC: 97 MG/DL
PH URINE: 6
PLATELET # BLD AUTO: 183 K/UL
POTASSIUM SERPL-SCNC: 4.8 MMOL/L
PROT SERPL-MCNC: 6.4 G/DL
PROTEIN URINE: 300 MG/DL
RBC # BLD: 5.2 M/UL
RBC # FLD: 13.4 %
RED BLOOD CELLS URINE: 1 /HPF
SODIUM SERPL-SCNC: 139 MMOL/L
SPECIFIC GRAVITY URINE: 1.02
T3RU NFR SERPL: 1 TBI
T4 SERPL-MCNC: 9.3 UG/DL
TRIGL SERPL-MCNC: 85 MG/DL
TSH SERPL-ACNC: 2.14 UIU/ML
URATE SERPL-MCNC: 6.4 MG/DL
UROBILINOGEN URINE: 0.2 MG/DL
VIT B12 SERPL-MCNC: 394 PG/ML
WBC # FLD AUTO: 6.27 K/UL
WHITE BLOOD CELLS URINE: 0 /HPF

## 2024-03-12 ENCOUNTER — APPOINTMENT (OUTPATIENT)
Dept: INTERNAL MEDICINE | Facility: CLINIC | Age: 69
End: 2024-03-12
Payer: MEDICARE

## 2024-03-12 ENCOUNTER — NON-APPOINTMENT (OUTPATIENT)
Age: 69
End: 2024-03-12

## 2024-03-12 VITALS — WEIGHT: 251 LBS | BODY MASS INDEX: 35.14 KG/M2 | HEIGHT: 71 IN

## 2024-03-12 VITALS — SYSTOLIC BLOOD PRESSURE: 130 MMHG | DIASTOLIC BLOOD PRESSURE: 70 MMHG

## 2024-03-12 DIAGNOSIS — I25.10 ATHEROSCLEROTIC HEART DISEASE OF NATIVE CORONARY ARTERY W/OUT ANGINA PECTORIS: ICD-10-CM

## 2024-03-12 DIAGNOSIS — E78.00 PURE HYPERCHOLESTEROLEMIA, UNSPECIFIED: ICD-10-CM

## 2024-03-12 DIAGNOSIS — Z95.5 PRESENCE OF CORONARY ANGIOPLASTY IMPLANT AND GRAFT: ICD-10-CM

## 2024-03-12 DIAGNOSIS — N18.9 CHRONIC KIDNEY DISEASE, UNSPECIFIED: ICD-10-CM

## 2024-03-12 DIAGNOSIS — I10 ESSENTIAL (PRIMARY) HYPERTENSION: ICD-10-CM

## 2024-03-12 PROCEDURE — 99214 OFFICE O/P EST MOD 30 MIN: CPT

## 2024-03-12 PROCEDURE — G2211 COMPLEX E/M VISIT ADD ON: CPT

## 2024-03-12 PROCEDURE — 93000 ELECTROCARDIOGRAM COMPLETE: CPT

## 2024-03-12 RX ORDER — EVOLOCUMAB 140 MG/ML
140 INJECTION, SOLUTION SUBCUTANEOUS
Qty: 6 | Refills: 3 | Status: ACTIVE | COMMUNITY
Start: 2024-03-12 | End: 1900-01-01

## 2024-03-12 RX ORDER — FLUVASTATIN 40 MG/1
40 CAPSULE ORAL
Qty: 90 | Refills: 3 | Status: ACTIVE | COMMUNITY
Start: 2024-03-12 | End: 1900-01-01

## 2024-03-12 NOTE — HISTORY OF PRESENT ILLNESS
[de-identified] : This is a 68-year-old gentleman with a history of ASHD, status post myocardial infarction, status post cardiac stent who in addition has a history of hypertension, hypercholesterolemia and status post nephrectomy for renal carcinoma

## 2024-03-12 NOTE — ASSESSMENT
[FreeTextEntry1] : Problems Hypercholesterolemia-the patient presently is on fluvastatin 80 mg XL plus Zetia 10 mg.  However his LDLs are not at goal they are still hovering between 80-90.  I started the patient on Repatha  140 mgs 1 injection every 2 weeks plus I decreased the dose of his fluvastatin to 40 mg and discontinued his Zetia I advised him to return in 1 month to assess his liver enzymes and his lipid status Status post nephrectomy-his creatinine has been stable at 1.5 Hypertension-his blood pressure is presently well-controlled with metoprolol 25 mg, amlodipine 5 mg, and losartan 50 mg

## 2024-03-21 ENCOUNTER — RX RENEWAL (OUTPATIENT)
Age: 69
End: 2024-03-21

## 2024-04-09 ENCOUNTER — RX RENEWAL (OUTPATIENT)
Age: 69
End: 2024-04-09

## 2024-04-09 RX ORDER — FLUVASTATIN SODIUM 80 MG/1
80 TABLET, EXTENDED RELEASE ORAL
Qty: 90 | Refills: 0 | Status: ACTIVE | COMMUNITY
Start: 2023-05-12 | End: 1900-01-01

## 2024-04-09 RX ORDER — AMLODIPINE BESYLATE 5 MG/1
5 TABLET ORAL
Qty: 180 | Refills: 2 | Status: ACTIVE | COMMUNITY
Start: 2018-04-08 | End: 1900-01-01

## 2024-04-10 NOTE — PRE-ANESTHESIA EVALUATION ADULT - WEIGHT IN KG
Consent: The patient's consent was obtained including but not limited to risks of crusting, scabbing, blistering, scarring, darker or lighter pigmentary change, recurrence, incomplete removal and infection.
Render Note In Bullet Format When Appropriate: No
Show Applicator Variable?: Yes
Detail Level: Zone
117.9
Post-Care Instructions: I reviewed with the patient in detail post-care instructions. Patient is to wear sunprotection, and avoid picking at any of the treated lesions. Pt may apply Vaseline to crusted or scabbing areas.
Duration Of Freeze Thaw-Cycle (Seconds): 3
Number Of Freeze-Thaw Cycles: 1 freeze-thaw cycle

## 2024-04-11 ENCOUNTER — APPOINTMENT (OUTPATIENT)
Dept: INTERNAL MEDICINE | Facility: CLINIC | Age: 69
End: 2024-04-11

## 2024-04-18 DIAGNOSIS — M19.90 UNSPECIFIED OSTEOARTHRITIS, UNSPECIFIED SITE: ICD-10-CM

## 2024-04-23 ENCOUNTER — RX RENEWAL (OUTPATIENT)
Age: 69
End: 2024-04-23

## 2024-04-23 RX ORDER — LOSARTAN POTASSIUM 50 MG/1
50 TABLET, FILM COATED ORAL DAILY
Qty: 90 | Refills: 2 | Status: ACTIVE | COMMUNITY
Start: 2022-01-10 | End: 1900-01-01

## 2024-04-23 NOTE — ED ADULT NURSE NOTE - NS ED NOTE ABUSE SUSPICION NEGLECT YN
Pt's Mom stated that patient was seen by Deepti this past Friday and was put on Amoxicillin, however patient now has a cough and would like to kow if Deepti can prescribe patient a cough medicine?    Please Advise   No

## 2024-05-07 ENCOUNTER — RX RENEWAL (OUTPATIENT)
Age: 69
End: 2024-05-07

## 2024-05-07 RX ORDER — AZITHROMYCIN 250 MG/1
250 TABLET, FILM COATED ORAL
Qty: 1 | Refills: 0 | Status: ACTIVE | COMMUNITY
Start: 2019-11-04 | End: 1900-01-01

## 2024-05-07 RX ORDER — ALLOPURINOL 100 MG/1
100 TABLET ORAL
Qty: 180 | Refills: 1 | Status: ACTIVE | COMMUNITY
Start: 2019-07-30 | End: 1900-01-01

## 2024-05-08 ENCOUNTER — NON-APPOINTMENT (OUTPATIENT)
Age: 69
End: 2024-05-08

## 2024-05-08 ENCOUNTER — APPOINTMENT (OUTPATIENT)
Dept: ELECTROPHYSIOLOGY | Facility: CLINIC | Age: 69
End: 2024-05-08
Payer: MEDICARE

## 2024-05-08 PROCEDURE — 93294 REM INTERROG EVL PM/LDLS PM: CPT

## 2024-05-08 PROCEDURE — 93296 REM INTERROG EVL PM/IDS: CPT

## 2024-07-01 ENCOUNTER — RX RENEWAL (OUTPATIENT)
Age: 69
End: 2024-07-01

## 2024-07-03 NOTE — ASU PATIENT PROFILE, ADULT - CAREGIVER
Physical Therapy   Date: 7/3/2024  Patient did not attend nor call to cancel  today's (7/2/2024) scheduled appointment.  This is the patient's second no show/late cancel.      Discussed no call/no show policy with patient.  She states that she is planning to return to therapy once she obtains her compression garments.  Cancelled the next 2 appointments based on estimated time of delivery.  Patient was agreeable to contacting office for rescheduling if she still does not have her compression garments.       
Yes

## 2024-07-09 ENCOUNTER — RX RENEWAL (OUTPATIENT)
Age: 69
End: 2024-07-09

## 2024-08-05 ENCOUNTER — APPOINTMENT (OUTPATIENT)
Dept: ELECTROPHYSIOLOGY | Facility: CLINIC | Age: 69
End: 2024-08-05

## 2024-08-09 ENCOUNTER — NON-APPOINTMENT (OUTPATIENT)
Age: 69
End: 2024-08-09

## 2024-08-12 ENCOUNTER — TRANSCRIPTION ENCOUNTER (OUTPATIENT)
Age: 69
End: 2024-08-12

## 2024-08-12 DIAGNOSIS — A09 INFECTIOUS GASTROENTERITIS AND COLITIS, UNSPECIFIED: ICD-10-CM

## 2024-08-12 RX ORDER — AZITHROMYCIN 500 MG/1
500 TABLET, FILM COATED ORAL DAILY
Qty: 1 | Refills: 0 | Status: ACTIVE | COMMUNITY
Start: 2024-08-12

## 2024-09-24 ENCOUNTER — NON-APPOINTMENT (OUTPATIENT)
Age: 69
End: 2024-09-24

## 2024-09-25 ENCOUNTER — NON-APPOINTMENT (OUTPATIENT)
Age: 69
End: 2024-09-25

## 2024-09-25 ENCOUNTER — APPOINTMENT (OUTPATIENT)
Dept: ELECTROPHYSIOLOGY | Facility: CLINIC | Age: 69
End: 2024-09-25
Payer: MEDICARE

## 2024-09-25 VITALS — SYSTOLIC BLOOD PRESSURE: 150 MMHG | DIASTOLIC BLOOD PRESSURE: 74 MMHG | HEART RATE: 73 BPM

## 2024-09-25 DIAGNOSIS — R55 SYNCOPE AND COLLAPSE: ICD-10-CM

## 2024-09-25 PROCEDURE — 93279 PRGRMG DEV EVAL PM/LDLS PM: CPT

## 2024-09-25 RX ORDER — TIRZEPATIDE 5 MG/.5ML
5 INJECTION, SOLUTION SUBCUTANEOUS
Refills: 0 | Status: ACTIVE | COMMUNITY

## 2024-10-03 ENCOUNTER — RX RENEWAL (OUTPATIENT)
Age: 69
End: 2024-10-03

## 2024-11-04 ENCOUNTER — RX RENEWAL (OUTPATIENT)
Age: 69
End: 2024-11-04

## 2024-11-07 ENCOUNTER — EMERGENCY (EMERGENCY)
Facility: HOSPITAL | Age: 69
LOS: 1 days | Discharge: HOME | End: 2024-11-07
Attending: EMERGENCY MEDICINE | Admitting: EMERGENCY MEDICINE
Payer: MEDICARE

## 2024-11-07 VITALS
RESPIRATION RATE: 16 BRPM | TEMPERATURE: 98 F | SYSTOLIC BLOOD PRESSURE: 163 MMHG | HEART RATE: 93 BPM | HEIGHT: 71 IN | WEIGHT: 235.01 LBS | OXYGEN SATURATION: 96 % | DIASTOLIC BLOOD PRESSURE: 83 MMHG

## 2024-11-07 VITALS
TEMPERATURE: 98 F | OXYGEN SATURATION: 98 % | SYSTOLIC BLOOD PRESSURE: 155 MMHG | HEART RATE: 95 BPM | DIASTOLIC BLOOD PRESSURE: 75 MMHG | RESPIRATION RATE: 14 BRPM

## 2024-11-07 DIAGNOSIS — Z98.890 OTHER SPECIFIED POSTPROCEDURAL STATES: Chronic | ICD-10-CM

## 2024-11-07 DIAGNOSIS — Z98.61 CORONARY ANGIOPLASTY STATUS: Chronic | ICD-10-CM

## 2024-11-07 LAB
ALBUMIN SERPL ELPH-MCNC: 3.9 G/DL — SIGNIFICANT CHANGE UP (ref 3.3–5)
ALP SERPL-CCNC: 137 U/L — HIGH (ref 30–120)
ALT FLD-CCNC: 30 U/L — SIGNIFICANT CHANGE UP (ref 10–60)
ANION GAP SERPL CALC-SCNC: 9 MMOL/L — SIGNIFICANT CHANGE UP (ref 5–17)
APPEARANCE UR: CLEAR — SIGNIFICANT CHANGE UP
AST SERPL-CCNC: 17 U/L — SIGNIFICANT CHANGE UP (ref 10–40)
BASOPHILS # BLD AUTO: 0.03 K/UL — SIGNIFICANT CHANGE UP (ref 0–0.2)
BASOPHILS NFR BLD AUTO: 0.3 % — SIGNIFICANT CHANGE UP (ref 0–2)
BILIRUB SERPL-MCNC: 0.6 MG/DL — SIGNIFICANT CHANGE UP (ref 0.2–1.2)
BILIRUB UR-MCNC: NEGATIVE — SIGNIFICANT CHANGE UP
BUN SERPL-MCNC: 31 MG/DL — HIGH (ref 7–23)
CALCIUM SERPL-MCNC: 9.5 MG/DL — SIGNIFICANT CHANGE UP (ref 8.4–10.5)
CHLORIDE SERPL-SCNC: 100 MMOL/L — SIGNIFICANT CHANGE UP (ref 96–108)
CO2 SERPL-SCNC: 29 MMOL/L — SIGNIFICANT CHANGE UP (ref 22–31)
COLOR SPEC: YELLOW — SIGNIFICANT CHANGE UP
CREAT SERPL-MCNC: 1.98 MG/DL — HIGH (ref 0.5–1.3)
DIFF PNL FLD: NEGATIVE — SIGNIFICANT CHANGE UP
EGFR: 36 ML/MIN/1.73M2 — LOW
EGFR: 36 ML/MIN/1.73M2 — LOW
EOSINOPHIL # BLD AUTO: 0.01 K/UL — SIGNIFICANT CHANGE UP (ref 0–0.5)
EOSINOPHIL NFR BLD AUTO: 0.1 % — SIGNIFICANT CHANGE UP (ref 0–6)
GLUCOSE SERPL-MCNC: 166 MG/DL — HIGH (ref 70–99)
GLUCOSE UR QL: NEGATIVE MG/DL — SIGNIFICANT CHANGE UP
HCT VFR BLD CALC: 45.8 % — SIGNIFICANT CHANGE UP (ref 39–50)
HGB BLD-MCNC: 15.1 G/DL — SIGNIFICANT CHANGE UP (ref 13–17)
IMM GRANULOCYTES NFR BLD AUTO: 0.5 % — SIGNIFICANT CHANGE UP (ref 0–0.9)
KETONES UR-MCNC: NEGATIVE MG/DL — SIGNIFICANT CHANGE UP
LACTATE SERPL-SCNC: 1.8 MMOL/L — SIGNIFICANT CHANGE UP (ref 0.7–2)
LEUKOCYTE ESTERASE UR-ACNC: NEGATIVE — SIGNIFICANT CHANGE UP
LIDOCAIN IGE QN: 35 U/L — SIGNIFICANT CHANGE UP (ref 16–77)
LYMPHOCYTES # BLD AUTO: 0.63 K/UL — LOW (ref 1–3.3)
LYMPHOCYTES # BLD AUTO: 7.2 % — LOW (ref 13–44)
MCHC RBC-ENTMCNC: 29.8 PG — SIGNIFICANT CHANGE UP (ref 27–34)
MCHC RBC-ENTMCNC: 33 G/DL — SIGNIFICANT CHANGE UP (ref 32–36)
MCV RBC AUTO: 90.3 FL — SIGNIFICANT CHANGE UP (ref 80–100)
MONOCYTES # BLD AUTO: 0.25 K/UL — SIGNIFICANT CHANGE UP (ref 0–0.9)
MONOCYTES NFR BLD AUTO: 2.8 % — SIGNIFICANT CHANGE UP (ref 2–14)
NEUTROPHILS # BLD AUTO: 7.84 K/UL — HIGH (ref 1.8–7.4)
NEUTROPHILS NFR BLD AUTO: 89.1 % — HIGH (ref 43–77)
NITRITE UR-MCNC: NEGATIVE — SIGNIFICANT CHANGE UP
NRBC # BLD: 0 /100 WBCS — SIGNIFICANT CHANGE UP (ref 0–0)
NRBC BLD-RTO: 0 /100 WBCS — SIGNIFICANT CHANGE UP (ref 0–0)
PH UR: 5 — SIGNIFICANT CHANGE UP (ref 5–8)
PLATELET # BLD AUTO: 150 K/UL — SIGNIFICANT CHANGE UP (ref 150–400)
POTASSIUM SERPL-MCNC: 4.3 MMOL/L — SIGNIFICANT CHANGE UP (ref 3.5–5.3)
POTASSIUM SERPL-SCNC: 4.3 MMOL/L — SIGNIFICANT CHANGE UP (ref 3.5–5.3)
PROT SERPL-MCNC: 6.9 G/DL — SIGNIFICANT CHANGE UP (ref 6–8.3)
PROT UR-MCNC: 100 MG/DL
RBC # BLD: 5.07 M/UL — SIGNIFICANT CHANGE UP (ref 4.2–5.8)
RBC # FLD: 13.2 % — SIGNIFICANT CHANGE UP (ref 10.3–14.5)
SODIUM SERPL-SCNC: 138 MMOL/L — SIGNIFICANT CHANGE UP (ref 135–145)
SP GR SPEC: 1.02 — SIGNIFICANT CHANGE UP (ref 1–1.03)
UROBILINOGEN FLD QL: 0.2 MG/DL — SIGNIFICANT CHANGE UP (ref 0.2–1)
WBC # BLD: 8.8 K/UL — SIGNIFICANT CHANGE UP (ref 3.8–10.5)
WBC # FLD AUTO: 8.8 K/UL — SIGNIFICANT CHANGE UP (ref 3.8–10.5)

## 2024-11-07 PROCEDURE — 74177 CT ABD & PELVIS W/CONTRAST: CPT | Mod: 26,MC

## 2024-11-07 PROCEDURE — 99285 EMERGENCY DEPT VISIT HI MDM: CPT

## 2024-11-07 RX ORDER — ONDANSETRON HCL/PF 4 MG/2 ML
1 VIAL (ML) INJECTION
Qty: 1 | Refills: 0
Start: 2024-11-07

## 2024-11-07 RX ORDER — ONDANSETRON HCL/PF 4 MG/2 ML
4 VIAL (ML) INJECTION ONCE
Refills: 0 | Status: COMPLETED | OUTPATIENT
Start: 2024-11-07 | End: 2024-11-07

## 2024-11-07 RX ORDER — METRONIDAZOLE 250 MG
1 TABLET ORAL
Qty: 20 | Refills: 0
Start: 2024-11-07 | End: 2024-11-16

## 2024-11-07 RX ADMIN — Medication 4 MILLIGRAM(S): at 11:16

## 2024-11-07 RX ADMIN — Medication 1000 MILLILITER(S): at 11:11

## 2024-11-07 RX ADMIN — Medication 4 MILLIGRAM(S): at 10:01

## 2024-11-07 RX ADMIN — Medication 1000 MILLILITER(S): at 11:54

## 2024-11-07 RX ADMIN — Medication 4 MILLIGRAM(S): at 11:09

## 2024-11-07 RX ADMIN — Medication 1000 MILLILITER(S): at 11:09

## 2024-11-20 PROCEDURE — 96376 TX/PRO/DX INJ SAME DRUG ADON: CPT

## 2024-11-20 PROCEDURE — 96361 HYDRATE IV INFUSION ADD-ON: CPT

## 2024-11-20 PROCEDURE — 36415 COLL VENOUS BLD VENIPUNCTURE: CPT

## 2024-11-20 PROCEDURE — 85025 COMPLETE CBC W/AUTO DIFF WBC: CPT

## 2024-11-20 PROCEDURE — 96375 TX/PRO/DX INJ NEW DRUG ADDON: CPT

## 2024-11-20 PROCEDURE — 83690 ASSAY OF LIPASE: CPT

## 2024-11-20 PROCEDURE — 83605 ASSAY OF LACTIC ACID: CPT

## 2024-11-20 PROCEDURE — 74177 CT ABD & PELVIS W/CONTRAST: CPT | Mod: MC

## 2024-11-20 PROCEDURE — 96374 THER/PROPH/DIAG INJ IV PUSH: CPT | Mod: XU

## 2024-11-20 PROCEDURE — 99284 EMERGENCY DEPT VISIT MOD MDM: CPT | Mod: 25

## 2024-11-20 PROCEDURE — 81001 URINALYSIS AUTO W/SCOPE: CPT

## 2024-11-20 PROCEDURE — 80053 COMPREHEN METABOLIC PANEL: CPT

## 2024-12-26 ENCOUNTER — APPOINTMENT (OUTPATIENT)
Dept: ELECTROPHYSIOLOGY | Facility: CLINIC | Age: 69
End: 2024-12-26
Payer: MEDICARE

## 2024-12-26 ENCOUNTER — NON-APPOINTMENT (OUTPATIENT)
Age: 69
End: 2024-12-26

## 2024-12-26 PROCEDURE — 93296 REM INTERROG EVL PM/IDS: CPT

## 2024-12-26 PROCEDURE — 93294 REM INTERROG EVL PM/LDLS PM: CPT

## 2025-01-06 ENCOUNTER — RX RENEWAL (OUTPATIENT)
Age: 70
End: 2025-01-06

## 2025-01-14 ENCOUNTER — RX RENEWAL (OUTPATIENT)
Age: 70
End: 2025-01-14

## 2025-01-16 ENCOUNTER — APPOINTMENT (OUTPATIENT)
Dept: INTERNAL MEDICINE | Facility: CLINIC | Age: 70
End: 2025-01-16

## 2025-03-28 ENCOUNTER — NON-APPOINTMENT (OUTPATIENT)
Age: 70
End: 2025-03-28

## 2025-03-31 ENCOUNTER — APPOINTMENT (OUTPATIENT)
Dept: ELECTROPHYSIOLOGY | Facility: CLINIC | Age: 70
End: 2025-03-31
Payer: MEDICARE

## 2025-03-31 ENCOUNTER — NON-APPOINTMENT (OUTPATIENT)
Age: 70
End: 2025-03-31

## 2025-03-31 PROCEDURE — 93294 REM INTERROG EVL PM/LDLS PM: CPT

## 2025-03-31 PROCEDURE — 93296 REM INTERROG EVL PM/IDS: CPT

## 2025-04-19 ENCOUNTER — RX RENEWAL (OUTPATIENT)
Age: 70
End: 2025-04-19

## 2025-05-06 ENCOUNTER — RX RENEWAL (OUTPATIENT)
Age: 70
End: 2025-05-06

## 2025-06-30 ENCOUNTER — APPOINTMENT (OUTPATIENT)
Dept: CT IMAGING | Facility: CLINIC | Age: 70
End: 2025-06-30
Payer: MEDICARE

## 2025-06-30 ENCOUNTER — OUTPATIENT (OUTPATIENT)
Dept: OUTPATIENT SERVICES | Facility: HOSPITAL | Age: 70
LOS: 1 days | End: 2025-06-30
Payer: MEDICARE

## 2025-06-30 ENCOUNTER — APPOINTMENT (OUTPATIENT)
Dept: ELECTROPHYSIOLOGY | Facility: CLINIC | Age: 70
End: 2025-06-30
Payer: MEDICARE

## 2025-06-30 ENCOUNTER — NON-APPOINTMENT (OUTPATIENT)
Age: 70
End: 2025-06-30

## 2025-06-30 DIAGNOSIS — Z98.890 OTHER SPECIFIED POSTPROCEDURAL STATES: Chronic | ICD-10-CM

## 2025-06-30 DIAGNOSIS — R11.14 BILIOUS VOMITING: ICD-10-CM

## 2025-06-30 DIAGNOSIS — Z98.61 CORONARY ANGIOPLASTY STATUS: Chronic | ICD-10-CM

## 2025-06-30 PROCEDURE — 93294 REM INTERROG EVL PM/LDLS PM: CPT

## 2025-06-30 PROCEDURE — 74150 CT ABDOMEN W/O CONTRAST: CPT | Mod: MA

## 2025-06-30 PROCEDURE — 93296 REM INTERROG EVL PM/IDS: CPT

## 2025-06-30 PROCEDURE — 74150 CT ABDOMEN W/O CONTRAST: CPT | Mod: 26

## 2025-07-23 ENCOUNTER — RX RENEWAL (OUTPATIENT)
Age: 70
End: 2025-07-23